# Patient Record
Sex: MALE | Race: WHITE | NOT HISPANIC OR LATINO | Employment: FULL TIME | URBAN - NONMETROPOLITAN AREA
[De-identification: names, ages, dates, MRNs, and addresses within clinical notes are randomized per-mention and may not be internally consistent; named-entity substitution may affect disease eponyms.]

---

## 2018-01-09 NOTE — PROGRESS NOTES
Assessment    1  Encounter for preventive health examination (V70 0) (Z00 00)   2  Colon cancer screening (V76 51) (Z12 11)   3  Soft tissue swelling of chest wall (786 6) (R22 2)   4  Cutaneous candidiasis (112 3) (B37 2)   5  Soft tissue mass (729 90) (M79 9)    Plan  Colon cancer screening    · COLONOSCOPY; Status:Active; Requested for:[id number];   Cutaneous candidiasis    · Nystatin 103518 UNIT/GM External Cream; APPLY BID PRN  Health Maintenance    · Aspirin 81 MG Oral Tablet; TAKE 1 TABLET DAILY   · B Complex Oral Capsule; TAKE 1 CAPSULE DAILY   · Zoster (Zostavax) (Zoster (Zostavax)); as directed  Soft tissue mass    · US GROIN; Status:Hold For - Scheduling; Requested for:[id number];   Soft tissue swelling of chest wall    · US CHEST; Status:Hold For - Scheduling; Requested for:11Apr2016;     Discussion/Summary  Impression: health maintenance visit  Currently, he eats a healthy diet and eats an adequate diet  Prostate cancer screening: the risks and benefits of prostate cancer screening were discussed and prostate cancer screening is current  Testicular cancer screening: the risks and benefits of testicular cancer screening were discussed, self testicular exam technique was taught and monthly self testicular exam was advised  Colorectal cancer screening: the risks and benefits of colorectal cancer screening were discussed, colonoscopy has been ordered and colonoscopy is needed every ten years  The risks and benefits of immunizations were discussed and immunizations will be given as outlined in the orders  He was advised to be evaluated by an optometrist and a dentist  Advice and education were given regarding nutrition, aerobic exercise, weight bearing exercise, weight loss, calcium supplements, vitamin D supplements, reproductive health, cardiovascular risk reduction, alcohol use, sunscreen use, self skin examination, helmet use, seat belt use, fall risk reduction and advanced directive planning   Patient discussion: discussed with the patient  Colonoscopy  Zostavax  Pneumovax  Possible side effects of new medications were reviewed with the patient/guardian today  History of Present Illness  HM, Adult Male: The patient is being seen for a health maintenance evaluation  General Health: The patient's health since the last visit is described as good  He has regular dental visits  He denies vision problems  He denies hearing loss  Immunizations status: up to date  Lifestyle:  He consumes a diverse and healthy diet  He does not have any weight concerns  He exercises regularly  He does not use tobacco  He denies alcohol use  He denies drug use  Reproductive health:  the patient is not sexually active  birth control is not being practiced  Screening: cancer screening reviewed and updated  metabolic screening reviewed and updated  risk screening reviewed and updated  Review of Systems    Constitutional: No fever or chills, feels well, no tiredness, no recent weight gain or weight loss  Eyes: No complaints of eye pain, no red eyes, no discharge from eyes, no itchy eyes  ENT: no complaints of earache, no hearing loss, no nosebleeds, no nasal discharge, no sore throat, no hoarseness  Cardiovascular: No complaints of slow heart rate, no fast heart rate, no chest pain, no palpitations, no leg claudication, no lower extremity  Respiratory: No complaints of shortness of breath, no wheezing, no cough, no SOB on exertion, no orthopnea or PND  Gastrointestinal: No complaints of abdominal pain, no constipation, no nausea or vomiting, no diarrhea or bloody stools  Genitourinary: No complaints of dysuria, no incontinence, no hesitancy, no nocturia, no genital lesion, no testicular pain  Musculoskeletal: No complaints of arthralgia, no myalgias, no joint swelling or stiffness, no limb pain or swelling     Integumentary: No complaints of skin rash or skin lesions, no itching, no skin wound, no dry skin    Neurological: No compliants of headache, no confusion, no convulsions, no numbness or tingling, no dizziness or fainting, no limb weakness, no difficulty walking  Psychiatric: Is not suicidal, no sleep disturbances, no anxiety or depression, no change in personality, no emotional problems  Endocrine: No complaints of proptosis, no hot flashes, no muscle weakness, no erectile dysfunction, no deepening of the voice, no feelings of weakness  Hematologic/Lymphatic: No complaints of swollen glands, no swollen glands in the neck, does not bleed easily, no easy bruising  Active Problems    1  Acute maxillary sinusitis, recurrence not specified (461 0) (J01 00)   2  Acute otitis media (382 9) (H66 90)   3  Dyslipidemia (272 4) (E78 5)   4  Fatigue (780 79) (R53 83)   5  Special screening examination for neoplasm of prostate (V76 44) (Z12 5)    Surgical History    · History of Appendectomy    Family History    · Family history of Diabetes (250 00) (E11 9)    · Family history of Cancer (199 1) (C80 1)    Social History    · Never a smoker   · No alcohol use    Current Meds   1  No Reported Medications Recorded    Allergies    1  Zantac    Vitals   Recorded: 11Apr2016 05:13PM   Heart Rate 66   Systolic 457, LUE, Sitting   Diastolic 78, LUE, Sitting   Height 5 ft 7 in   Weight 165 lb 8 0 oz   BMI Calculated 25 92   BSA Calculated 1 87   O2 Saturation 97     Physical Exam    Constitutional   General appearance: No acute distress, well appearing and well nourished  Eyes   Conjunctiva and lids: No swelling, erythema, or discharge  Pupils and irises: Equal, round and reactive to light  Ears, Nose, Mouth, and Throat   External inspection of ears and nose: Normal     Otoscopic examination: Tympanic membrance translucent with normal light reflex  Canals patent without erythema  Oropharynx: Normal with no erythema, edema, exudate or lesions      Pulmonary   Respiratory effort: No increased work of breathing or signs of respiratory distress  Auscultation of lungs: Clear to auscultation  Cardiovascular   Auscultation of heart: Normal rate and rhythm, normal S1 and S2, without murmurs  Examination of extremities for edema and/or varicosities: Normal     Abdomen   Abdomen: Non-tender, no masses  Liver and spleen: No hepatomegaly or splenomegaly  Lymphatic   Palpation of lymph nodes in neck: No lymphadenopathy  Musculoskeletal   Gait and station: Normal     Digits and nails: Normal without clubbing or cyanosis  Inspection/palpation of joints, bones, and muscles: Abnormal   soft tissue mass noted to lower chest wall c/w lipoma, small mass noted to suprapubic area  Skin   Skin and subcutaneous tissue: Abnormal   rash of cutaneous candidiasis noted to both groin creases,  Neurologic   Cranial nerves: Cranial nerves 2-12 intact  Reflexes: 2+ and symmetric  Sensation: No sensory loss  Psychiatric   Orientation to person, place and time: Normal     Mood and affect: Normal        Procedure    Procedure: Visual Acuity Test    Results: normal in both eyes  The patient tolerated the procedure well  There were no complications  Procedure: Hearing Acuity Test    Audiometry:   The patient Tolerated the procedure well  There were no complications  Signatures   Electronically signed by : GO Harmon; Apr 11 2016  6:09PM EST                       (Author)    Electronically signed by : Sera Rueda DO;  Apr 12 2016  9:33AM EST                       (Author)

## 2018-01-15 NOTE — RESULT NOTES
Verified Results  (1) COMPREHENSIVE METABOLIC PANEL [id number] 89:42YC [facility] Order Number: [id number]      National Kidney Disease Education Program recommendations are as follows:  GFR calculation is accurate only with a steady state creatinine  Chronic Kidney disease less than 60 ml/min/1 73 sq  meters  Kidney failure less than 15 ml/min/1 73 sq  meters  Test Name Result Flag Reference   GLUCOSE,RANDM 93 mg/dL     If the patient is fasting, the ADA then defines impaired fasting glucose as > 100 mg/dL and diabetes as > or equal to 123 mg/dL     SODIUM 141 mmol/L  136-145   POTASSIUM 4 1 mmol/L  3 5-5 3   CHLORIDE 105 mmol/L  100-108   CARBON DIOXIDE 28 mmol/L  21-32   ANION GAP (CALC) 8 mmol/L  4-13   BLOOD UREA NITROGEN 18 mg/dL  5-25   CREATININE 0 76 mg/dL  0 60-1 30   Standardized to IDMS reference method   CALCIUM 8 5 mg/dL  8 3-10 1   BILI, TOTAL 0 73 mg/dL  0 20-1 00   ALK PHOSPHATAS 83 U/L     ALT (SGPT) 69 U/L  12-78   AST(SGOT) 39 U/L  5-45   ALBUMIN 3 9 g/dL  3 5-5 0   TOTAL PROTEIN 6 6 g/dL  6 4-8 2   eGFR Non-African American      >60 0 ml/min/1 73sq m     (1) CBC/PLT/DIFF [id number] 11:32AM [facility] Order Number: [id number]     Order Number: [id number]     Test Name Result Flag Reference   WBC COUNT 4 37 Thousand/uL  4 31-10 16   RBC COUNT 4 93 Million/uL  3 88-5 62   HEMOGLOBIN 14 7 g/dL  12 0-17 0   HEMATOCRIT 42 3 %  36 5-49 3   MCV 86 fL  82-98   MCH 29 8 pg  26 8-34 3   MCHC 34 8 g/dL  31 4-37 4   RDW 12 4 %  11 6-15 1   MPV 10 5 fL  8 9-12 7   PLATELET COUNT 313 Thousands/uL  149-390   NEUTROPHILS RELATIVE PERCENT 45 %  43-75   LYMPHOCYTES RELATIVE PERCENT 36 %  14-44   MONOCYTES RELATIVE PERCENT 14 % H 4-12   EOSINOPHILS RELATIVE PERCENT 4 %  0-6   BASOPHILS RELATIVE PERCENT 1 %  0-1   NEUTROPHILS ABSOLUTE COUNT 1 98 Thousands/µL  1 85-7 62   LYMPHOCYTES ABSOLUTE COUNT 1 57 Thousands/µL  0 60-4 47   MONOCYTES ABSOLUTE COUNT 0 59 Thousand/µL  0 17-1 22 EOSINOPHILS ABSOLUTE COUNT 0 19 Thousand/µL  0 00-0 61   BASOPHILS ABSOLUTE COUNT 0 04 Thousands/µL  0 00-0 10     (1) LIPID PANEL, FASTING [id number] 11:32AM [facility] Order Number: [id number]      Triglyceride:         Normal              <150 mg/dl       Borderline High    150-199 mg/dl       High               200-499 mg/dl       Very High          >499 mg/dl  Cholesterol:         Desirable        <200 mg/dl      Borderline High  200-239 mg/dl      High             >239 mg/dl  HDL Cholesterol:        High    >59 mg/dL      Low     <41 mg/dL     Test Name Result Flag Reference   CHOLESTEROL 164 mg/dL     HDL,DIRECT 63 mg/dL H 40-60   LDL CHOLESTEROL CALCULATED 95 mg/dL  0-100   TRIGLYCERIDES 32 mg/dL  <=150     (1) PSA (SCREEN) (Dx V76 44 Screen for Prostate Cancer) [id number] 11:32AM [facility] Order Number: [id number]    TW Order Number: [id number]     Test Name Result Flag Reference   PROSTATE SPECIFIC ANTIGEN 0 5 ng/mL  0 0-4 0

## 2021-02-04 ENCOUNTER — OFFICE VISIT (OUTPATIENT)
Dept: FAMILY MEDICINE CLINIC | Facility: CLINIC | Age: 66
End: 2021-02-04
Payer: COMMERCIAL

## 2021-02-04 VITALS
HEIGHT: 66 IN | SYSTOLIC BLOOD PRESSURE: 108 MMHG | WEIGHT: 155 LBS | DIASTOLIC BLOOD PRESSURE: 76 MMHG | TEMPERATURE: 96 F | BODY MASS INDEX: 24.91 KG/M2

## 2021-02-04 DIAGNOSIS — K21.9 GASTROESOPHAGEAL REFLUX DISEASE, UNSPECIFIED WHETHER ESOPHAGITIS PRESENT: Primary | ICD-10-CM

## 2021-02-04 DIAGNOSIS — J30.89 PERENNIAL ALLERGIC RHINITIS: ICD-10-CM

## 2021-02-04 DIAGNOSIS — Z13.6 SCREENING FOR CARDIOVASCULAR CONDITION: ICD-10-CM

## 2021-02-04 DIAGNOSIS — Z13.228 SCREENING FOR METABOLIC DISORDER: ICD-10-CM

## 2021-02-04 DIAGNOSIS — Z12.11 SCREENING FOR COLORECTAL CANCER: ICD-10-CM

## 2021-02-04 DIAGNOSIS — Z00.00 WELCOME TO MEDICARE PREVENTIVE VISIT: ICD-10-CM

## 2021-02-04 DIAGNOSIS — Z12.12 SCREENING FOR COLORECTAL CANCER: ICD-10-CM

## 2021-02-04 DIAGNOSIS — Z12.5 SCREENING FOR PROSTATE CANCER: ICD-10-CM

## 2021-02-04 PROBLEM — G47.33 OBSTRUCTIVE SLEEP APNEA: Status: ACTIVE | Noted: 2021-02-04

## 2021-02-04 PROCEDURE — 3288F FALL RISK ASSESSMENT DOCD: CPT | Performed by: FAMILY MEDICINE

## 2021-02-04 PROCEDURE — 99214 OFFICE O/P EST MOD 30 MIN: CPT | Performed by: FAMILY MEDICINE

## 2021-02-04 PROCEDURE — 1101F PT FALLS ASSESS-DOCD LE1/YR: CPT | Performed by: FAMILY MEDICINE

## 2021-02-04 PROCEDURE — 1036F TOBACCO NON-USER: CPT | Performed by: FAMILY MEDICINE

## 2021-02-04 PROCEDURE — 3725F SCREEN DEPRESSION PERFORMED: CPT | Performed by: FAMILY MEDICINE

## 2021-02-04 PROCEDURE — 3008F BODY MASS INDEX DOCD: CPT | Performed by: FAMILY MEDICINE

## 2021-02-04 PROCEDURE — 1160F RVW MEDS BY RX/DR IN RCRD: CPT | Performed by: FAMILY MEDICINE

## 2021-02-04 RX ORDER — DESLORATADINE 5 MG/1
5 TABLET ORAL DAILY
Qty: 30 TABLET | Refills: 3 | Status: SHIPPED | OUTPATIENT
Start: 2021-02-04 | End: 2021-09-28

## 2021-02-04 RX ORDER — MULTIVITAMIN
CAPSULE ORAL
COMMUNITY

## 2021-02-04 RX ORDER — DESLORATADINE 5 MG/1
5 TABLET ORAL DAILY
Qty: 30 TABLET | Refills: 3 | Status: SHIPPED | OUTPATIENT
Start: 2021-02-04 | End: 2021-02-04 | Stop reason: SDUPTHER

## 2021-02-04 NOTE — PROGRESS NOTES
BMI Counseling: Body mass index is 25 02 kg/m²  The BMI is above normal  Nutrition recommendations include decreasing portion sizes, encouraging healthy choices of fruits and vegetables, moderation in carbohydrate intake and increasing intake of lean protein  Assessment/Plan:    Problem List Items Addressed This Visit     None           There are no diagnoses linked to this encounter  No problem-specific Assessment & Plan notes found for this encounter  Subjective:      Patient ID: Cortney Su is a 76 y o  male  Mr Rickey Maria is here for follow-up visit to reestablish himself as a patient he just recently went on Medicare he has a couple of issues on 1 of them being that he sounds like he has reflux and possibly a sliding hiatal hernia he is experiencing oral pharyngeal and esophageal dysphagia the 2nd issue is perennial allergies he normally takes Sudafed for this but during ragweed season and even through the entire winter he experiences nasal congestion and postnasal drip      The following portions of the patient's history were reviewed and updated as appropriate:   He has no past medical history on file  ,  does not have a problem list on file  ,   has no past surgical history on file  ,  family history is not on file  ,   reports that he has never smoked  He has never used smokeless tobacco  He reports current alcohol use  No history on file for drug ,  is allergic to chocolate and ranitidine     Current Outpatient Medications   Medication Sig Dispense Refill    Aspirin Buf,CaCarb-MgCarb-MgO, 81 MG TABS Take 1 tablet by mouth daily      B Complex Vitamins (B COMPLEX 50 PO) Take 1 capsule by mouth daily      Multiple Vitamin (multivitamin) capsule Take by mouth       No current facility-administered medications for this visit  Review of Systems   Constitutional: Negative for activity change, appetite change, diaphoresis, fatigue and fever  HENT: Positive for dental problem  Negative for hearing loss  Eyes: Positive for visual disturbance  Patient has had bilateral cataract extractions he is currently using mono vision because he has 2 different strength lenses having issues with reading needs to go back to his ophthalmologist   Respiratory: Positive for cough and shortness of breath  Negative for apnea, chest tightness and wheezing  Cardiovascular: Negative for chest pain, palpitations and leg swelling  Gastrointestinal: Negative for abdominal distention, abdominal pain, anal bleeding, constipation, diarrhea, nausea and vomiting  Endocrine: Negative for cold intolerance, heat intolerance, polydipsia, polyphagia and polyuria  Genitourinary: Negative for difficulty urinating, dysuria, flank pain, hematuria and urgency  Musculoskeletal: Positive for arthralgias  Negative for back pain, gait problem, joint swelling and myalgias  Skin: Negative for color change, rash and wound  Allergic/Immunologic: Negative for environmental allergies, food allergies and immunocompromised state  Neurological: Negative for dizziness, seizures, syncope, speech difficulty, numbness and headaches  Hematological: Negative for adenopathy  Does not bruise/bleed easily  Psychiatric/Behavioral: Negative for agitation, behavioral problems, hallucinations, sleep disturbance and suicidal ideas  Objective:  Vitals:    02/04/21 1424   BP: 108/76   BP Location: Left arm   Patient Position: Sitting   Cuff Size: Standard   Temp: (!) 96 °F (35 6 °C)   TempSrc: Tympanic   Weight: 70 3 kg (155 lb)   Height: 5' 6" (1 676 m)     Body mass index is 25 02 kg/m²  Physical Exam  Constitutional:       General: He is not in acute distress  Appearance: He is well-developed  He is not diaphoretic  HENT:      Head: Normocephalic  Right Ear: External ear normal       Left Ear: External ear normal       Nose: Nose normal    Eyes:      General: No scleral icterus          Right eye: No discharge  Left eye: No discharge  Conjunctiva/sclera: Conjunctivae normal       Pupils: Pupils are equal, round, and reactive to light  Neck:      Musculoskeletal: Normal range of motion  Thyroid: No thyromegaly  Trachea: No tracheal deviation  Cardiovascular:      Rate and Rhythm: Normal rate and regular rhythm  Heart sounds: Normal heart sounds  No murmur  No friction rub  No gallop  Pulmonary:      Effort: Pulmonary effort is normal  No respiratory distress  Breath sounds: Normal breath sounds  No wheezing  Abdominal:      General: Bowel sounds are normal       Palpations: Abdomen is soft  There is no mass  Tenderness: There is no abdominal tenderness  There is no guarding  Genitourinary:     Prostate: Normal       Rectum: Normal    Musculoskeletal:         General: No deformity  Lymphadenopathy:      Cervical: No cervical adenopathy  Skin:     General: Skin is warm and dry  Findings: No erythema or rash  Neurological:      Mental Status: He is alert and oriented to person, place, and time  Cranial Nerves: No cranial nerve deficit  Psychiatric:         Thought Content:  Thought content normal

## 2021-02-04 NOTE — PATIENT INSTRUCTIONS

## 2021-02-10 ENCOUNTER — TELEPHONE (OUTPATIENT)
Dept: GASTROENTEROLOGY | Facility: MEDICAL CENTER | Age: 66
End: 2021-02-10

## 2021-03-26 ENCOUNTER — IMMUNIZATIONS (OUTPATIENT)
Dept: FAMILY MEDICINE CLINIC | Facility: HOSPITAL | Age: 66
End: 2021-03-26

## 2021-03-26 DIAGNOSIS — Z23 ENCOUNTER FOR IMMUNIZATION: Primary | ICD-10-CM

## 2021-03-26 PROCEDURE — 0011A SARS-COV-2 / COVID-19 MRNA VACCINE (MODERNA) 100 MCG: CPT

## 2021-03-26 PROCEDURE — 91301 SARS-COV-2 / COVID-19 MRNA VACCINE (MODERNA) 100 MCG: CPT

## 2021-04-22 ENCOUNTER — HOSPITAL ENCOUNTER (INPATIENT)
Facility: HOSPITAL | Age: 66
LOS: 3 days | Discharge: HOME | DRG: 094 | End: 2021-04-25
Attending: SURGERY | Admitting: SURGERY
Payer: OTHER MISCELLANEOUS

## 2021-04-22 ENCOUNTER — APPOINTMENT (EMERGENCY)
Dept: CT IMAGING | Facility: HOSPITAL | Age: 66
End: 2021-04-22
Payer: OTHER MISCELLANEOUS

## 2021-04-22 ENCOUNTER — APPOINTMENT (INPATIENT)
Dept: RADIOLOGY | Facility: HOSPITAL | Age: 66
DRG: 094 | End: 2021-04-22
Payer: OTHER MISCELLANEOUS

## 2021-04-22 ENCOUNTER — HOSPITAL ENCOUNTER (EMERGENCY)
Facility: HOSPITAL | Age: 66
End: 2021-04-22
Attending: EMERGENCY MEDICINE
Payer: OTHER MISCELLANEOUS

## 2021-04-22 ENCOUNTER — APPOINTMENT (EMERGENCY)
Dept: RADIOLOGY | Facility: HOSPITAL | Age: 66
End: 2021-04-22
Payer: OTHER MISCELLANEOUS

## 2021-04-22 VITALS
DIASTOLIC BLOOD PRESSURE: 81 MMHG | RESPIRATION RATE: 22 BRPM | WEIGHT: 155 LBS | SYSTOLIC BLOOD PRESSURE: 151 MMHG | HEART RATE: 62 BPM | OXYGEN SATURATION: 95 % | HEIGHT: 66 IN | TEMPERATURE: 98.2 F | BODY MASS INDEX: 24.91 KG/M2

## 2021-04-22 DIAGNOSIS — S22.49XA RIB FRACTURES: ICD-10-CM

## 2021-04-22 DIAGNOSIS — J93.9 PNEUMOTHORAX: ICD-10-CM

## 2021-04-22 DIAGNOSIS — J93.9 PNEUMOTHORAX ON RIGHT: Primary | ICD-10-CM

## 2021-04-22 DIAGNOSIS — S22.41XA CLOSED FRACTURE OF MULTIPLE RIBS OF RIGHT SIDE: ICD-10-CM
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
## 2021-04-22 LAB
ALBUMIN SERPL BCP-MCNC: 3.7 G/DL (ref 3.5–5)
ALP SERPL-CCNC: 77 U/L (ref 46–116)
ALT SERPL W P-5'-P-CCNC: 46 U/L (ref 12–78)
ANION GAP SERPL CALCULATED.3IONS-SCNC: 7 MMOL/L (ref 4–13)
AST SERPL W P-5'-P-CCNC: 34 U/L (ref 5–45)
BASOPHILS # BLD AUTO: 0.05 THOUSANDS/ΜL (ref 0–0.1)
BASOPHILS NFR BLD AUTO: 1 % (ref 0–1)
BILIRUB DIRECT SERPL-MCNC: 0.17 MG/DL (ref 0–0.2)
BILIRUB SERPL-MCNC: 0.61 MG/DL (ref 0.2–1)
BUN SERPL-MCNC: 24 MG/DL (ref 5–25)
CALCIUM SERPL-MCNC: 8.6 MG/DL (ref 8.3–10.1)
CHLORIDE SERPL-SCNC: 106 MMOL/L (ref 100–108)
CO2 SERPL-SCNC: 30 MMOL/L (ref 21–32)
CREAT SERPL-MCNC: 0.8 MG/DL (ref 0.6–1.3)
EOSINOPHIL # BLD AUTO: 0.21 THOUSAND/ΜL (ref 0–0.61)
EOSINOPHIL NFR BLD AUTO: 4 % (ref 0–6)
ERYTHROCYTE [DISTWIDTH] IN BLOOD BY AUTOMATED COUNT: 12.4 % (ref 11.6–15.1)
GFR SERPL CREATININE-BSD FRML MDRD: 93 ML/MIN/1.73SQ M
GLUCOSE SERPL-MCNC: 111 MG/DL (ref 65–140)
HCT VFR BLD AUTO: 39.1 % (ref 36.5–49.3)
HGB BLD-MCNC: 13.1 G/DL (ref 12–17)
IMM GRANULOCYTES # BLD AUTO: 0.02 THOUSAND/UL (ref 0–0.2)
IMM GRANULOCYTES NFR BLD AUTO: 0 % (ref 0–2)
LYMPHOCYTES # BLD AUTO: 1.4 THOUSANDS/ΜL (ref 0.6–4.47)
LYMPHOCYTES NFR BLD AUTO: 29 % (ref 14–44)
MCH RBC QN AUTO: 30.1 PG (ref 26.8–34.3)
MCHC RBC AUTO-ENTMCNC: 33.5 G/DL (ref 31.4–37.4)
MCV RBC AUTO: 90 FL (ref 82–98)
MONOCYTES # BLD AUTO: 0.56 THOUSAND/ΜL (ref 0.17–1.22)
MONOCYTES NFR BLD AUTO: 12 % (ref 4–12)
NEUTROPHILS # BLD AUTO: 2.59 THOUSANDS/ΜL (ref 1.85–7.62)
NEUTS SEG NFR BLD AUTO: 54 % (ref 43–75)
NRBC BLD AUTO-RTO: 0 /100 WBCS
PLATELET # BLD AUTO: 188 THOUSANDS/UL (ref 149–390)
PMV BLD AUTO: 9.7 FL (ref 8.9–12.7)
POTASSIUM SERPL-SCNC: 3.7 MMOL/L (ref 3.5–5.3)
PROT SERPL-MCNC: 6.8 G/DL (ref 6.4–8.2)
RBC # BLD AUTO: 4.35 MILLION/UL (ref 3.88–5.62)
SODIUM SERPL-SCNC: 143 MMOL/L (ref 136–145)
TROPONIN I SERPL-MCNC: <0.02 NG/ML
WBC # BLD AUTO: 4.83 THOUSAND/UL (ref 4.31–10.16)

## 2021-04-22 PROCEDURE — 99285 EMERGENCY DEPT VISIT HI MDM: CPT

## 2021-04-22 PROCEDURE — 71045 X-RAY EXAM CHEST 1 VIEW: CPT

## 2021-04-22 PROCEDURE — 99221 1ST HOSP IP/OBS SF/LOW 40: CPT | Performed by: SURGERY

## 2021-04-22 PROCEDURE — 85025 COMPLETE CBC W/AUTO DIFF WBC: CPT | Performed by: EMERGENCY MEDICINE

## 2021-04-22 PROCEDURE — 93005 ELECTROCARDIOGRAM TRACING: CPT

## 2021-04-22 PROCEDURE — 36415 COLL VENOUS BLD VENIPUNCTURE: CPT | Performed by: EMERGENCY MEDICINE

## 2021-04-22 PROCEDURE — 94668 MNPJ CHEST WALL SBSQ: CPT

## 2021-04-22 PROCEDURE — 70450 CT HEAD/BRAIN W/O DYE: CPT

## 2021-04-22 PROCEDURE — 80048 BASIC METABOLIC PNL TOTAL CA: CPT | Performed by: EMERGENCY MEDICINE

## 2021-04-22 PROCEDURE — 71260 CT THORAX DX C+: CPT

## 2021-04-22 PROCEDURE — 80076 HEPATIC FUNCTION PANEL: CPT | Performed by: EMERGENCY MEDICINE

## 2021-04-22 PROCEDURE — 84484 ASSAY OF TROPONIN QUANT: CPT | Performed by: EMERGENCY MEDICINE

## 2021-04-22 PROCEDURE — 99285 EMERGENCY DEPT VISIT HI MDM: CPT | Performed by: EMERGENCY MEDICINE

## 2021-04-22 PROCEDURE — 74177 CT ABD & PELVIS W/CONTRAST: CPT

## 2021-04-22 RX ORDER — ONDANSETRON 2 MG/ML
4 INJECTION INTRAMUSCULAR; INTRAVENOUS EVERY 6 HOURS PRN
Status: DISCONTINUED | OUTPATIENT
Start: 2021-04-22 | End: 2021-04-25 | Stop reason: HOSPADM

## 2021-04-22 RX ORDER — SENNOSIDES 8.6 MG
2 TABLET ORAL DAILY
Status: DISCONTINUED | OUTPATIENT
Start: 2021-04-23 | End: 2021-04-25 | Stop reason: HOSPADM

## 2021-04-22 RX ORDER — HYDROMORPHONE HCL IN WATER/PF 6 MG/30 ML
0.2 PATIENT CONTROLLED ANALGESIA SYRINGE INTRAVENOUS
Status: DISCONTINUED | OUTPATIENT
Start: 2021-04-22 | End: 2021-04-25 | Stop reason: HOSPADM

## 2021-04-22 RX ORDER — METHOCARBAMOL 500 MG/1
500 TABLET, FILM COATED ORAL EVERY 6 HOURS SCHEDULED
Status: DISCONTINUED | OUTPATIENT
Start: 2021-04-22 | End: 2021-04-25 | Stop reason: HOSPADM

## 2021-04-22 RX ORDER — OXYCODONE HYDROCHLORIDE 5 MG/1
5 TABLET ORAL EVERY 4 HOURS PRN
Status: DISCONTINUED | OUTPATIENT
Start: 2021-04-22 | End: 2021-04-22

## 2021-04-22 RX ORDER — ACETAMINOPHEN 325 MG/1
975 TABLET ORAL EVERY 8 HOURS SCHEDULED
Status: DISCONTINUED | OUTPATIENT
Start: 2021-04-22 | End: 2021-04-25 | Stop reason: HOSPADM

## 2021-04-22 RX ORDER — PSEUDOEPHEDRINE HCL 60 MG/1
60 TABLET ORAL EVERY 4 HOURS PRN
COMMUNITY

## 2021-04-22 RX ORDER — ASPIRIN 81 MG/1
81 TABLET, CHEWABLE ORAL DAILY
Status: DISCONTINUED | OUTPATIENT
Start: 2021-04-23 | End: 2021-04-23

## 2021-04-22 RX ORDER — LIDOCAINE 50 MG/G
1 PATCH TOPICAL DAILY
Status: DISCONTINUED | OUTPATIENT
Start: 2021-04-23 | End: 2021-04-25 | Stop reason: HOSPADM

## 2021-04-22 RX ORDER — HYDROMORPHONE HCL/PF 1 MG/ML
0.5 SYRINGE (ML) INJECTION
Status: DISCONTINUED | OUTPATIENT
Start: 2021-04-22 | End: 2021-04-22

## 2021-04-22 RX ORDER — DOCUSATE SODIUM 100 MG/1
100 CAPSULE, LIQUID FILLED ORAL 2 TIMES DAILY
Status: DISCONTINUED | OUTPATIENT
Start: 2021-04-22 | End: 2021-04-25 | Stop reason: HOSPADM

## 2021-04-22 RX ORDER — OXYCODONE HYDROCHLORIDE 5 MG/1
2.5 TABLET ORAL EVERY 4 HOURS PRN
Status: DISCONTINUED | OUTPATIENT
Start: 2021-04-22 | End: 2021-04-25 | Stop reason: HOSPADM

## 2021-04-22 RX ORDER — MELATONIN
2000 DAILY
COMMUNITY

## 2021-04-22 RX ORDER — OXYCODONE HYDROCHLORIDE 10 MG/1
10 TABLET ORAL EVERY 4 HOURS PRN
Status: DISCONTINUED | OUTPATIENT
Start: 2021-04-22 | End: 2021-04-22

## 2021-04-22 RX ORDER — OXYCODONE HYDROCHLORIDE 5 MG/1
5 TABLET ORAL EVERY 4 HOURS PRN
Status: DISCONTINUED | OUTPATIENT
Start: 2021-04-22 | End: 2021-04-25 | Stop reason: HOSPADM

## 2021-04-22 RX ADMIN — OXYCODONE HYDROCHLORIDE 5 MG: 5 TABLET ORAL at 20:32

## 2021-04-22 RX ADMIN — METHOCARBAMOL 500 MG: 500 TABLET, FILM COATED ORAL at 20:31

## 2021-04-22 RX ADMIN — ENOXAPARIN SODIUM 30 MG: 30 INJECTION SUBCUTANEOUS at 21:27

## 2021-04-22 RX ADMIN — IOHEXOL 100 ML: 350 INJECTION, SOLUTION INTRAVENOUS at 17:24

## 2021-04-22 RX ADMIN — DOCUSATE SODIUM 100 MG: 100 CAPSULE, LIQUID FILLED ORAL at 20:32

## 2021-04-22 RX ADMIN — ACETAMINOPHEN 975 MG: 325 TABLET ORAL at 21:27

## 2021-04-22 NOTE — H&P
H&P Exam - Trauma   Mariah Lawler 76 y o  male MRN: [id number]  Unit/Bed#:  Encounter: [id number]    Assessment/Plan   Trauma Alert: Other transfer from [address]: Ambulance  Trauma Team: Attending Santos Gan , Residents Ra and Fellow Ishan  Consultants: None    Trauma Active Problems:   Fall   Right 2nd-5th rib fractures   Right pneumothorax     Trauma Plan:   Trauma admission   Repeat CXR in AM   Rib fracture protocol   Pain control   APS consult   Continuous pulse ox   Incentive spirometry   PT/OT evals when able     Chief Complaint: Right chest wall pain     History of Present Illness   HPI:  Mariah Lawler is a 76 y o  male who presents as a transfer from [facility] for right 2nd-5th rib fractures and associated pneumothorax status post fall 2 days ago  Patient states that he was on a ladder when fell 4 feet onto the ground hitting his right side  He had immediate right sided chest wall pain and associated shortness of breath after falling  Denies any chest pain or dizziness/lightheadedness prior to falling His shortness of breath resolved the same night he fell  He has continued to have right chest wall pain that is pleuritic and progressing in severity  Denies head strike or LOC  No AC  Takes 81mg ASA daily for health maintenance per PCP recommendations  Denies headache, changes in vision, substernal chest pain, SOB, abdominal pain, N/V, or back pain  Past medical  Cardiac workup done at [facility] ED prior to transfer was unremarkable (EKG showed normal sinus rhythm, normal axis, normal intervals and QRS and no ST changes; troponin negative)  Mechanism:Fall    Review of Systems   All other systems reviewed and are negative  12-point, complete review of systems was reviewed and negative except as stated above  Historical Information     No past medical history on file    Past Surgical History:   Procedure Laterality Date    APPENDECTOMY       Social History   Social History Substance and Sexual Activity   Alcohol Use Yes    Frequency: Monthly or less     Social History     Substance and Sexual Activity   Drug Use Never     Social History     Tobacco Use   Smoking Status Never Smoker   Smokeless Tobacco Never Used     E-Cigarette/Vaping    E-Cigarette Use Never User      E-Cigarette/Vaping Substances     Immunization History   Administered Date(s) Administered    INFLUENZA 11/18/2017, 10/01/2020    Influenza, seasonal, injectable 12/14/2013    Pneumococcal Conjugate 13-Valent 09/26/2020    Pneumococcal Polysaccharide PPV23 04/11/2016    SARS-CoV-2 / COVID-19 mRNA IM (Lowell Mcdaniel) 03/26/2021       Meds/Allergies   all current active meds have been reviewed    Allergies   Allergen Reactions    Chocolate - Food Allergy Other (See Comments)     Cold sweats    Ranitidine Edema         PHYSICAL EXAM    Objective   Vitals:   First set:      Primary Survey:   (A) Airway: intact   (B) Breathing: clear bilaterally   (C) Circulation: Pulses:   normal  (D) Disabliity:  GCS Total:  15  (E) Expose:  Completed    Secondary Survey: (Click on Physical Exam tab above)  Physical Exam  Constitutional:       General: He is not in acute distress  Appearance: He is not ill-appearing or toxic-appearing  HENT:      Head: Normocephalic and atraumatic  Right Ear: External ear normal       Left Ear: External ear normal       Nose: Nose normal       Mouth/Throat:      Mouth: Mucous membranes are moist       Pharynx: Oropharynx is clear  Eyes:      Extraocular Movements: Extraocular movements intact  Pupils: Pupils are equal, round, and reactive to light  Neck:      Musculoskeletal: Normal range of motion  No neck rigidity or muscular tenderness  Cardiovascular:      Rate and Rhythm: Normal rate and regular rhythm  Pulses: Normal pulses  Heart sounds: Normal heart sounds  Pulmonary:      Effort: Pulmonary effort is normal  No respiratory distress        Breath sounds: Decreased breath sounds present  Comments: Decreased breath sounds on right compared to left  Right sided chest wall tenderness; no palpable deformity, step-off, or crepitus   Chest:      Chest wall: Tenderness present  Abdominal:      General: Abdomen is flat  There is no distension  Palpations: Abdomen is soft  Tenderness: There is no abdominal tenderness  Musculoskeletal: Normal range of motion  General: No tenderness or signs of injury  Skin:     General: Skin is warm  Capillary Refill: Capillary refill takes less than 2 seconds  Neurological:      General: No focal deficit present  Mental Status: He is alert and oriented to person, place, and time  Cranial Nerves: No cranial nerve deficit  Motor: No weakness  Psychiatric:         Mood and Affect: Mood normal          Behavior: Behavior normal          Invasive Devices     Peripheral Intravenous Line            Peripheral IV 04/22/21 Right Antecubital less than 1 day                Lab Results: Results: I have personally reviewed pertinent reports  Imaging/EKG Studies: Results: I have personally reviewed pertinent reports      Other Studies: None     Code Status: Level 1 - Full Code  Advance Directive and Living Will:      Power of :    POLST:

## 2021-04-22 NOTE — ASSESSMENT & PLAN NOTE
- Multiple right-sided rib fractures (2-5), present on admission   - Continue rib fracture protocol   - Continue to encourage incentive spirometer use and adequate pulmonary hygiene  Currently pulling 1250 mL on I S   - Continue multimodal analgesic regimen  Appreciate APS evaluation and recommendations   - Supplemental oxygen via nasal cannula as needed to maintain saturations greater than or equal to 94%  Currently on 2 L of oxygen at 98%  - Repeat chest x-ray from 4/23 reviewed: persistent right PTX  - PT and OT evaluation and treatment as indicated  Recommending DC home  - Repeat chest x-ray 4/24  - Outpatient follow-up in the trauma clinic for re-evaluation in approximately 2 weeks

## 2021-04-22 NOTE — ED PROVIDER NOTES
History  Chief Complaint   Patient presents with    Fall     pt fell two days ago, landing on his right side, denies hitting head, no loc, takes 81 mg aspirin daily     HPI  This is a 58-year-old male who fell 2 days ago comes in for evaluation of right-sided chest pain and right flank pain  Patient states that he was up on approximately a 3 ft ladder when it same can to the dirt he fell to his right side  He denies hitting his head but states he was dazed after the event  States he did have trouble breathing just after the event that resolved but now he has pain with breathing that is progressively worsening  He denies any shortness of breath denies any near-syncope or syncope  He has pain on the right side of his chest to the center of his chest   He denies any neck pain denies any back pain denies any fevers or chills  Prior to Admission Medications   Prescriptions Last Dose Informant Patient Reported? Taking? Aspirin Buf,CaCarb-MgCarb-MgO, 81 MG TABS   Yes No   Sig: Take 1 tablet by mouth daily   B Complex Vitamins (B COMPLEX 50 PO)   Yes No   Sig: Take 1 capsule by mouth daily   Multiple Vitamin (multivitamin) capsule   Yes No   Sig: Take by mouth   desloratadine (CLARINEX) 5 MG tablet   No No   Sig: Take 1 tablet (5 mg total) by mouth daily      Facility-Administered Medications: None       History reviewed  No pertinent past medical history  Past Surgical History:   Procedure Laterality Date    APPENDECTOMY         History reviewed  No pertinent family history  I have reviewed and agree with the history as documented  E-Cigarette/Vaping    E-Cigarette Use Never User      E-Cigarette/Vaping Substances     Social History     Tobacco Use    Smoking status: Never Smoker    Smokeless tobacco: Never Used   Substance Use Topics    Alcohol use: Yes     Frequency: Monthly or less    Drug use: Never       Review of Systems   Constitutional: Negative  Negative for chills and fever     HENT: Negative  Negative for ear pain and sore throat  Eyes: Negative  Negative for pain and discharge  Respiratory: Positive for chest tightness  Negative for shortness of breath  Cardiovascular: Positive for chest pain  Negative for palpitations  Gastrointestinal: Negative  Negative for abdominal pain, nausea and vomiting  Endocrine: Negative  Negative for polyphagia and polyuria  Genitourinary: Negative  Negative for dysuria and flank pain  Musculoskeletal: Negative  Negative for arthralgias and back pain  Skin: Negative  Negative for color change and wound  Allergic/Immunologic: Negative  Negative for food allergies and immunocompromised state  Neurological: Negative  Negative for weakness and headaches  Hematological: Negative  Negative for adenopathy  Does not bruise/bleed easily  Psychiatric/Behavioral: Negative  Negative for suicidal ideas  The patient is not nervous/anxious  Physical Exam  Physical Exam  Vitals signs and nursing note reviewed  Constitutional:       General: He is not in acute distress  Appearance: Normal appearance  He is not diaphoretic  HENT:      Head: Normocephalic and atraumatic  Right Ear: External ear normal       Left Ear: External ear normal       Nose: Nose normal  No congestion or rhinorrhea  Mouth/Throat:      Mouth: Mucous membranes are moist    Eyes:      General: No scleral icterus  Right eye: No discharge  Left eye: No discharge  Conjunctiva/sclera: Conjunctivae normal       Pupils: Pupils are equal, round, and reactive to light  Neck:      Musculoskeletal: Normal range of motion and neck supple  No neck rigidity  Cardiovascular:      Rate and Rhythm: Regular rhythm  Pulses: Normal pulses  Heart sounds: Normal heart sounds  Pulmonary:      Effort: Pulmonary effort is normal  No respiratory distress  Breath sounds: Normal breath sounds  No stridor  No wheezing, rhonchi or rales  Chest:      Chest wall: Tenderness (Mild right-sided chest tenderness, no crepitus ) present  Abdominal:      General: Abdomen is flat  There is no distension  Palpations: Abdomen is soft  Tenderness: There is no abdominal tenderness  There is no guarding  Musculoskeletal: Normal range of motion  General: No swelling, tenderness or deformity  Skin:     General: Skin is warm and dry  Capillary Refill: Capillary refill takes less than 2 seconds  Findings: No lesion or rash  Neurological:      General: No focal deficit present  Mental Status: He is alert and oriented to person, place, and time  Cranial Nerves: No cranial nerve deficit  Sensory: No sensory deficit  Psychiatric:         Mood and Affect: Mood normal          Behavior: Behavior normal          Thought Content:  Thought content normal          Vital Signs  ED Triage Vitals [04/22/21 1607]   Temperature Pulse Respirations Blood Pressure SpO2   98 2 °F (36 8 °C) 68 18 147/85 96 %      Temp Source Heart Rate Source Patient Position - Orthostatic VS BP Location FiO2 (%)   Temporal Monitor Sitting Right arm --      Pain Score       9           Vitals:    04/22/21 1607 04/22/21 1700 04/22/21 1800 04/22/21 1845   BP: 147/85 118/67 150/82 151/81   Pulse: 68 (!) 54 63 62   Patient Position - Orthostatic VS: Sitting Lying Sitting Sitting         Visual Acuity  Visual Acuity      Most Recent Value   L Pupil Size (mm)  3   R Pupil Size (mm)  3          ED Medications  Medications   iohexol (OMNIPAQUE) 350 MG/ML injection (MULTI-DOSE) 100 mL (100 mL Intravenous Given 4/22/21 1724)       Diagnostic Studies  Results Reviewed     Procedure Component Value Units Date/Time    Troponin I [[id number]]  (Normal) Collected: 04/22/21 1634    Lab Status: Final result Specimen: Blood from Arm, Right Updated: 04/22/21 1656     Troponin I <0 02 ng/mL     Basic metabolic panel [[id number]] Collected: 04/22/21 1634    Lab Status: Final result Specimen: Blood from Arm, Right Updated: 04/22/21 1653     Sodium 143 mmol/L      Potassium 3 7 mmol/L      Chloride 106 mmol/L      CO2 30 mmol/L      ANION GAP 7 mmol/L      BUN 24 mg/dL      Creatinine 0 80 mg/dL      Glucose 111 mg/dL      Calcium 8 6 mg/dL      eGFR 93 ml/min/1 73sq m     Narrative:      [place] guidelines for Chronic Kidney Disease (CKD):     Stage 1 with normal or high GFR (GFR > 90 mL/min/1 73 square meters)    Stage 2 Mild CKD (GFR = 60-89 mL/min/1 73 square meters)    Stage 3A Moderate CKD (GFR = 45-59 mL/min/1 73 square meters)    Stage 3B Moderate CKD (GFR = 30-44 mL/min/1 73 square meters)    Stage 4 Severe CKD (GFR = 15-29 mL/min/1 73 square meters)    Stage 5 End Stage CKD (GFR <15 mL/min/1 73 square meters)  Note: GFR calculation is accurate only with a steady state creatinine    Hepatic function panel [[id number]]  (Normal) Collected: 04/22/21 1634    Lab Status: Final result Specimen: Blood from Arm, Right Updated: 04/22/21 1653     Total Bilirubin 0 61 mg/dL      Bilirubin, Direct 0 17 mg/dL      Alkaline Phosphatase 77 U/L      AST 34 U/L      ALT 46 U/L      Total Protein 6 8 g/dL      Albumin 3 7 g/dL     CBC and differential [[id number]] Collected: 04/22/21 1634    Lab Status: Final result Specimen: Blood from Arm, Right Updated: 04/22/21 1641     WBC 4 83 Thousand/uL      RBC 4 35 Million/uL      Hemoglobin 13 1 g/dL      Hematocrit 39 1 %      MCV 90 fL      MCH 30 1 pg      MCHC 33 5 g/dL      RDW 12 4 %      MPV 9 7 fL      Platelets 095 Thousands/uL      nRBC 0 /100 WBCs      Neutrophils Relative 54 %      Immat GRANS % 0 %      Lymphocytes Relative 29 %      Monocytes Relative 12 %      Eosinophils Relative 4 %      Basophils Relative 1 %      Neutrophils Absolute 2 59 Thousands/µL      Immature Grans Absolute 0 02 Thousand/uL      Lymphocytes Absolute 1 40 Thousands/µL      Monocytes Absolute 0 56 Thousand/µL      Eosinophils Absolute 0 21 Thousand/µL      Basophils Absolute 0 05 Thousands/µL                  CT chest abdomen pelvis w contrast   Final Result by Paul Toscano MD (04/22 1808)      Multiple right nondisplaced 2nd through 5th lateral rib fractures with moderate-sized pneumothorax  No mediastinal shift  Small right pleural effusion with moderate partial atelectasis of the right middle and lower lobes  Punctate focus of gas above the right diaphragm near the cardiophrenic sulcus  No pneumoperitoneum  I personally discussed this study with Abena Lira on 4/22/2021 at 6:02 PM                   Workstation performed: [id number]         CT head without contrast   Final Result by Paul Toscano MD (04/22 1739)      No acute intracranial abnormality  Workstation performed: [id number]         XR chest 1 view portable    (Results Pending)              Procedures  Procedures         ED Course  ED Course as of Apr 22 1917   Thu Apr 22, 2021   1746 I did speak with trauma fellow, Dr Delmar Lyon at [facility]  Patient will require transfer to the Trauma Service  I did discuss with the fellow regarding placement of a chest tube here  I did stress that I was willing to put in here  If I did that though, it would require the patient flies to [place] in a patient who is otherwise stable and has been stable for the last 2 days  After discussion with the trauma fellow, I will do a chest x-ray here that the fellow can compare when he gets to [place]  Will defer chest tube here until patient goes to [place] as he is otherwise stable  This EKG was interpreted by me  The EKG demonstrates Normal sinus rhythm, normal intervals and axis, normal QRS, no acute ST changes present  MDM  Number of Diagnoses or Management Options  Fall:   Pneumothorax:   Diagnosis management comments: 50-year-old male who presents after fall    Fall was 48 hours ago, he has normal vital signs, no obvious head trauma, will defer trauma alert at this time, will move forward with CT head, CT chest abdomen pelvis  Will get CBC, BMP prior to CT scan  Disposition will be pending results of workup  Disposition  Final diagnoses:   Fall   Pneumothorax   Rib fractures     Time reflects when diagnosis was documented in both MDM as applicable and the Disposition within this note     Time User Action Codes Description Comment    4/22/2021  5:46 PM Kevin Krause [R06  XXXA] Fall     4/22/2021  5:46 PM Kevin Coreasomons [J93 9] Pneumothorax     4/22/2021  7:17 PM Mason Mckeon Add [H31 [id number]] Rib fractures       ED Disposition     ED Disposition Condition Date/Time Comment    Transfer to Another Facility-In Network  Thu Apr 22, 2021  5:46 PM Adriana Givens should be transferred out to [facility]           MD Documentation      Most Recent Value   Patient Condition  The patient has been stabilized such that within reasonable medical probability, no material deterioration of the patient condition or the condition of the unborn child(hawa) is likely to result from the transfer   Reason for Transfer  Level of Care needed not available at this facility   Benefits of Transfer  Specialized equipment and/or services available at the receiving facility (Include comment)________________________   Risks of Transfer  Potential for delay in receiving treatment   Accepting Physician  Dr Rosamaria López Name, Radha Cole   Sending MD Dr Khushboo Baker   Provider Certification  General risk, such as traffic hazards, adverse weather conditions, rough terrain or turbulence, possible failure of equipment (including vehicle or aircraft), or consequences of actions of persons outside the control of the transport personnel      RN Documentation      [facility] [address] [facility] Name, [address]   B   Bed Assignment  ED   Report Given to  Ramon Mckinnon RN      Follow-up Information     Follow up With Specialties Details Why Contact Info    Cindy Taylor, [address]   [address]  [address]  [phone]            Discharge Medication List as of 4/22/2021  6:55 PM      CONTINUE these medications which have NOT CHANGED    Details   Aspirin Buf,CaCarb-MgCarb-MgO, 81 MG TABS Take 1 tablet by mouth daily, Starting Mon 4/11/2016, Historical Med      B Complex Vitamins (B COMPLEX 50 PO) Take 1 capsule by mouth daily, Starting Mon 4/11/2016, Historical Med      desloratadine (CLARINEX) 5 MG tablet Take 1 tablet (5 mg total) by mouth daily, Starting Thu 2/4/2021, Normal      Multiple Vitamin (multivitamin) capsule Take by mouth, Historical Med           No discharge procedures on file      PDMP Review     None          ED Provider  Electronically Signed by           Laxmi Hood MD  04/22/21 0159

## 2021-04-22 NOTE — LETTER
[address] [facility] [address]  [address] [id number]  Dept: [phone]    April 25, 2021     Patient: Mariah Lawler   YOB: 1955   Date of Visit: 4/22/2021       To Whom it May Concern:    Mariah Lawler is under my professional care  He was seen in the hospital from 4/22/2021   to 04/25/21  He is not cleared to return to work until seen and cleared by trauma in 2 weeks  If you have any questions or concerns, please don't hesitate to call           Sincerely,          Haley Costa PA-C

## 2021-04-22 NOTE — LETTER
[address] [facility] 6  [address] [facility] [address]  Dept: [phone]    April 25, 2021     Patient: Mono Vidal   YOB: 1955   Date of Visit: 4/22/2021       To Whom it May Concern:    Mono Vidal is under my professional care  He was seen in the hospital from 4/22/2021   to 04/25/21  He may not return to work until seen and cleared by the trauma service in 2 weeks  If you have any questions or concerns, please don't hesitate to call           Sincerely,          Brianna Bone PA-C

## 2021-04-22 NOTE — EMTALA/ACUTE CARE TRANSFER
[address] [facility] EMERGENCY DEPARTMENT  [address] [facility] [address]  Dept: [phone]      EMTALA TRANSFER CONSENT    NAME Johnna Ganser                                         1955                              MRN [id number]    I have been informed of my rights regarding examination, treatment, and transfer   by Dr Matthew Peters MD    Benefits: Specialized equipment and/or services available at the receiving facility (Include comment)________________________    Risks: Potential for delay in receiving treatment      Consent for Transfer:  I acknowledge that my medical condition has been evaluated and explained to me by the emergency department physician or other qualified medical person and/or my attending physician, who has recommended that I be transferred to the service of  Accepting Physician: Dr Mariia Taveras at [address] [facility] Name, [address] : [facility]  The above potential benefits of such transfer, the potential risks associated with such transfer, and the probable risks of not being transferred have been explained to me, and I fully understand them  The doctor has explained that, in my case, the benefits of transfer outweigh the risks  I agree to be transferred  I authorize the performance of emergency medical procedures and treatments upon me in both transit and upon arrival at the receiving facility  Additionally, I authorize the release of any and all medical records to the receiving facility and request they be transported with me, if possible  I understand that the safest mode of transportation during a medical emergency is an ambulance and that the Hospital advocates the use of this mode of transport  Risks of traveling to the receiving facility by car, including absence of medical control, life sustaining equipment, such as oxygen, and medical personnel has been explained to me and I fully understand them      (DIVINE CORRECT BOX BELOW)  [  ]  I consent to the stated transfer and to be transported by ambulance/helicopter  [  ]  I consent to the stated transfer, but refuse transportation by ambulance and accept full responsibility for my transportation by car  I understand the risks of non-ambulance transfers and I exonerate the Hospital and its staff from any deterioration in my condition that results from this refusal     X___________________________________________    DATE  21  TIME________  Signature of patient or legally responsible individual signing on patient behalf           RELATIONSHIP TO PATIENT_________________________          Provider Certification    NAME Mario Loaiza                                         1955                              MRN [id number]    A medical screening exam was performed on the above named patient  Based on the examination:    Condition Necessitating Transfer The primary encounter diagnosis was Fall  A diagnosis of Pneumothorax was also pertinent to this visit  Patient Condition: The patient has been stabilized such that within reasonable medical probability, no material deterioration of the patient condition or the condition of the unborn child(hawa) is likely to result from the transfer    Reason for Transfer: Level of Care needed not available at this facility    Transfer Requirements: Facility [facility]   · Space available and qualified personnel available for treatment as acknowledged by    · Agreed to accept transfer and to provide appropriate medical treatment as acknowledged by       Dr Adeola Quiroga  · Appropriate medical records of the examination and treatment of the patient are provided at the time of transfer   [address], [address] _______  · Transfer will be performed by qualified personnel from    and appropriate transfer equipment as required, including the use of necessary and appropriate life support measures      Provider Certification: I have examined the patient and explained the following risks and benefits of being transferred/refusing transfer to the patient/family:  General risk, such as traffic hazards, adverse weather conditions, rough terrain or turbulence, possible failure of equipment (including vehicle or aircraft), or consequences of actions of persons outside the control of the transport personnel      Based on these reasonable risks and benefits to the patient and/or the unborn child(hawa), and based upon the information available at the time of the patients examination, I certify that the medical benefits reasonably to be expected from the provision of appropriate medical treatments at another medical facility outweigh the increasing risks, if any, to the individuals medical condition, and in the case of labor to the unborn child, from effecting the transfer      X____________________________________________ DATE 04/22/21        TIME_______      ORIGINAL - SEND TO MEDICAL RECORDS   COPY - SEND WITH PATIENT DURING TRANSFER

## 2021-04-22 NOTE — ASSESSMENT & PLAN NOTE
· Status post fall 4/20   · 4/22 CT CAP: Multiple right nondisplaced 2nd through 5th lateral rib fractures with moderate-sized pneumothorax  No mediastinal shift  Small right pleural effusion with moderate partial atelectasis of the right middle and lower lobes    · Hemodynamically stable, spo2 >94% on RA   · Currently on 2 L O2 with 98%  · Aggressive pulmonary hygiene  · Respiratory protocol   · Continuous pulse ox  · Repeat CXR in AM 4/23: persistent pneumothorax  · Repeat CXR in AM 4/24  · Chest tube vs  Observe PTX

## 2021-04-23 ENCOUNTER — APPOINTMENT (INPATIENT)
Dept: RADIOLOGY | Facility: HOSPITAL | Age: 66
DRG: 094 | End: 2021-04-23
Payer: OTHER MISCELLANEOUS

## 2021-04-23 LAB
ANION GAP SERPL CALCULATED.3IONS-SCNC: 5 MMOL/L (ref 4–13)
ATRIAL RATE: 62 BPM
BUN SERPL-MCNC: 18 MG/DL (ref 5–25)
CALCIUM SERPL-MCNC: 8.4 MG/DL (ref 8.3–10.1)
CHLORIDE SERPL-SCNC: 110 MMOL/L (ref 100–108)
CO2 SERPL-SCNC: 27 MMOL/L (ref 21–32)
CREAT SERPL-MCNC: 0.59 MG/DL (ref 0.6–1.3)
ERYTHROCYTE [DISTWIDTH] IN BLOOD BY AUTOMATED COUNT: 12.5 % (ref 11.6–15.1)
GFR SERPL CREATININE-BSD FRML MDRD: 105 ML/MIN/1.73SQ M
GLUCOSE SERPL-MCNC: 95 MG/DL (ref 65–140)
HCT VFR BLD AUTO: 38.8 % (ref 36.5–49.3)
HGB BLD-MCNC: 13.2 G/DL (ref 12–17)
MAGNESIUM SERPL-MCNC: 2.2 MG/DL (ref 1.6–2.6)
MCH RBC QN AUTO: 30.3 PG (ref 26.8–34.3)
MCHC RBC AUTO-ENTMCNC: 34 G/DL (ref 31.4–37.4)
MCV RBC AUTO: 89 FL (ref 82–98)
P AXIS: 48 DEGREES
PHOSPHATE SERPL-MCNC: 3.7 MG/DL (ref 2.3–4.1)
PLATELET # BLD AUTO: 177 THOUSANDS/UL (ref 149–390)
PMV BLD AUTO: 10.1 FL (ref 8.9–12.7)
POTASSIUM SERPL-SCNC: 3.6 MMOL/L (ref 3.5–5.3)
PR INTERVAL: 158 MS
QRS AXIS: 39 DEGREES
QRSD INTERVAL: 88 MS
QT INTERVAL: 402 MS
QTC INTERVAL: 408 MS
RBC # BLD AUTO: 4.35 MILLION/UL (ref 3.88–5.62)
SODIUM SERPL-SCNC: 142 MMOL/L (ref 136–145)
T WAVE AXIS: 42 DEGREES
VENTRICULAR RATE: 62 BPM
WBC # BLD AUTO: 5.06 THOUSAND/UL (ref 4.31–10.16)

## 2021-04-23 PROCEDURE — 71045 X-RAY EXAM CHEST 1 VIEW: CPT

## 2021-04-23 PROCEDURE — 0W9930Z DRAINAGE OF RIGHT PLEURAL CAVITY WITH DRAINAGE DEVICE, PERCUTANEOUS APPROACH: ICD-10-PCS | Performed by: SURGERY

## 2021-04-23 PROCEDURE — 85027 COMPLETE CBC AUTOMATED: CPT | Performed by: ORTHOPAEDIC SURGERY

## 2021-04-23 PROCEDURE — 93010 ELECTROCARDIOGRAM REPORT: CPT | Performed by: INTERNAL MEDICINE

## 2021-04-23 PROCEDURE — 80048 BASIC METABOLIC PNL TOTAL CA: CPT | Performed by: ORTHOPAEDIC SURGERY

## 2021-04-23 PROCEDURE — 94760 N-INVAS EAR/PLS OXIMETRY 1: CPT

## 2021-04-23 PROCEDURE — 94664 DEMO&/EVAL PT USE INHALER: CPT

## 2021-04-23 PROCEDURE — 97166 OT EVAL MOD COMPLEX 45 MIN: CPT

## 2021-04-23 PROCEDURE — 99232 SBSQ HOSP IP/OBS MODERATE 35: CPT | Performed by: SURGERY

## 2021-04-23 PROCEDURE — 97162 PT EVAL MOD COMPLEX 30 MIN: CPT

## 2021-04-23 PROCEDURE — 84100 ASSAY OF PHOSPHORUS: CPT | Performed by: ORTHOPAEDIC SURGERY

## 2021-04-23 PROCEDURE — 83735 ASSAY OF MAGNESIUM: CPT | Performed by: ORTHOPAEDIC SURGERY

## 2021-04-23 PROCEDURE — NC001 PR NO CHARGE: Performed by: SURGERY

## 2021-04-23 PROCEDURE — 32551 INSERTION OF CHEST TUBE: CPT | Performed by: SURGERY

## 2021-04-23 PROCEDURE — 94668 MNPJ CHEST WALL SBSQ: CPT

## 2021-04-23 RX ORDER — LIDOCAINE HYDROCHLORIDE 10 MG/ML
20 INJECTION, SOLUTION EPIDURAL; INFILTRATION; INTRACAUDAL; PERINEURAL ONCE
Status: COMPLETED | OUTPATIENT
Start: 2021-04-23 | End: 2021-04-23

## 2021-04-23 RX ORDER — GABAPENTIN 100 MG/1
100 CAPSULE ORAL 3 TIMES DAILY
Status: DISCONTINUED | OUTPATIENT
Start: 2021-04-23 | End: 2021-04-25 | Stop reason: HOSPADM

## 2021-04-23 RX ADMIN — OXYCODONE HYDROCHLORIDE 5 MG: 5 TABLET ORAL at 16:32

## 2021-04-23 RX ADMIN — GABAPENTIN 100 MG: 100 CAPSULE ORAL at 21:46

## 2021-04-23 RX ADMIN — LIDOCAINE HYDROCHLORIDE 20 ML: 10 INJECTION, SOLUTION EPIDURAL; INFILTRATION; INTRACAUDAL at 14:25

## 2021-04-23 RX ADMIN — METHOCARBAMOL 500 MG: 500 TABLET, FILM COATED ORAL at 17:28

## 2021-04-23 RX ADMIN — OXYCODONE HYDROCHLORIDE 5 MG: 5 TABLET ORAL at 21:46

## 2021-04-23 RX ADMIN — METHOCARBAMOL 500 MG: 500 TABLET, FILM COATED ORAL at 23:11

## 2021-04-23 RX ADMIN — METHOCARBAMOL 500 MG: 500 TABLET, FILM COATED ORAL at 01:15

## 2021-04-23 RX ADMIN — METHOCARBAMOL 500 MG: 500 TABLET, FILM COATED ORAL at 06:19

## 2021-04-23 RX ADMIN — ACETAMINOPHEN 975 MG: 325 TABLET ORAL at 21:46

## 2021-04-23 RX ADMIN — LIDOCAINE 1 PATCH: 50 PATCH TOPICAL at 10:30

## 2021-04-23 RX ADMIN — DOCUSATE SODIUM 100 MG: 100 CAPSULE, LIQUID FILLED ORAL at 10:31

## 2021-04-23 RX ADMIN — METHOCARBAMOL 500 MG: 500 TABLET, FILM COATED ORAL at 12:12

## 2021-04-23 RX ADMIN — SENNOSIDES 17.2 MG: 8.6 TABLET, FILM COATED ORAL at 10:31

## 2021-04-23 RX ADMIN — ASPIRIN 81 MG: 81 TABLET, CHEWABLE ORAL at 10:31

## 2021-04-23 RX ADMIN — ACETAMINOPHEN 975 MG: 325 TABLET ORAL at 14:24

## 2021-04-23 RX ADMIN — DOCUSATE SODIUM 100 MG: 100 CAPSULE, LIQUID FILLED ORAL at 17:28

## 2021-04-23 RX ADMIN — OXYCODONE HYDROCHLORIDE 5 MG: 5 TABLET ORAL at 10:33

## 2021-04-23 RX ADMIN — ACETAMINOPHEN 975 MG: 325 TABLET ORAL at 06:19

## 2021-04-23 NOTE — PROGRESS NOTES
[address] [facility]  Progress Note Humberto Obregon 1955, 76 y o  male MRN: [id number]  Unit/Bed#: [facility] 633-01 Encounter: [id number]  Primary Care Provider: Fabio Schaefer DO   Date and time admitted to hospital: 4/22/2021  8:02 PM    [address]  · Fall from ladder  · Injuries as listed   · PT/OT recommending discharge home    Closed fracture of multiple ribs of right side  Assessment & Plan  - Multiple right-sided rib fractures (2-5), present on admission   - Continue rib fracture protocol   - Continue to encourage incentive spirometer use and adequate pulmonary hygiene  Currently pulling 1250 mL on I S   - Continue multimodal analgesic regimen  Appreciate APS evaluation and recommendations   - Supplemental oxygen via nasal cannula as needed to maintain saturations greater than or equal to 94%  Currently on 2 L of oxygen at 98%  - Repeat chest x-ray from 4/23 reviewed: persistent right PTX  - PT and OT evaluation and treatment as indicated  Recommending DC home  - Repeat chest x-ray 4/24  - Outpatient follow-up in the trauma clinic for re-evaluation in approximately 2 weeks  * Pneumothorax on right  Assessment & Plan  · Status post fall 4/20   · 4/22 CT CAP: Multiple right nondisplaced 2nd through 5th lateral rib fractures with moderate-sized pneumothorax  No mediastinal shift  Small right pleural effusion with moderate partial atelectasis of the right middle and lower lobes  · Hemodynamically stable, spo2 >94% on RA   · Currently on 2 L O2 with 98%  · Aggressive pulmonary hygiene  · Respiratory protocol   · Continuous pulse ox  · Repeat CXR in AM 4/23: persistent pneumothorax  · Repeat CXR in AM 4/24  · Chest tube vs  Observe PTX      TERTIARY TRAUMA SURVEY NOTE    Prophylaxis: Sequential compression device (Venodyne)  and Enoxaparin (Lovenox)    Disposition: Continue med surg level of care  Monitor respiratory status, pain level   ASA 81 and Lovenox held for APS consult  Chest tube vs  Observe PTX  Discharge home when medically stable    Code status:  Level 1 - Full Code    Consultants: APS    Is the patient 72 years or older?: YES:    1  Before the illness or injury that brought you to the Emergency, did you need someone to help you on a regular basis? 0=No   2  Since the illness or injury that brought you to the Emergency, have you needed more help than usual to take care of yourself? 0=No   3  Have you been hospitalized for one or more nights during the past 6 months (excluding a stay in the Emergency Department)? 0=No   4  In general, do you see well? 0=Yes   5  In general, do you have serious problems with your memory? 0=No   6  Do you take more than three different medications everyday? 0=No   TOTAL   0     Did you order a geriatric consult if the score was 2 or greater?: n/a    Identification of Seniors at Risk      Most Recent Value   (ISAR) Identification of Seniors at Risk   Before the illness or injury that brought you to the Emergency, did you need someone to help you on a regular basis? 0 Filed at: 04/22/2021 2014   In the last 24 hours, have you needed more help than usual?  0 Filed at: 04/22/2021 2014   Have you been hospitalized for one or more nights during the past 6 months? 0 Filed at: 04/22/2021 2014   In general, do you see well?  0 Filed at: 04/22/2021 2014   In general, do you have serious problems with your memory? 0 Filed at: 04/22/2021 2014   Do you take more than three different medications every day?  0 Filed at: 04/22/2021 2014   ISAR Score  0 Filed at: 04/22/2021 2014            SUBJECTIVE:     Transfer from: [facility]  Outside Films Received: yes  Tertiary Exam Due on: 4/23/2021    Mechanism of Injury:Fall    Details related to Injury: +LOC:  no    Chief Complaint: "I feel OK"    HPI/Last 24 hour events:  Patient reports that he feels okay  He has right-sided chest wall pain    Patient reports that he came to the hospital yesterday after having 2 nights of chest pain, difficulty sleeping due to shortness of breath after falling from a ladder on Tuesday, 4/20/2021  Patient is currently having nonlabored breathing on room air, saturating 95%  Patient reports that he has been going to work, ambulating without difficulty  Only having difficulty breathing when lying flat, trying to go to sleep  He admits that he does not take aspirin daily, but he has been taking ibuprofen for headaches    Active medications:           Current Facility-Administered Medications:     acetaminophen (TYLENOL) tablet 975 mg, 975 mg, Oral, Q8H NATY, 975 mg at 04/23/21 5521    aspirin chewable tablet 81 mg, 81 mg, Oral, Daily, 81 mg at 04/23/21 1031    docusate sodium (COLACE) capsule 100 mg, 100 mg, Oral, BID, 100 mg at 04/23/21 1031    HYDROmorphone HCl (DILAUDID) injection 0 2 mg, 0 2 mg, Intravenous, Q3H PRN    lidocaine (LIDODERM) 5 % patch 1 patch, 1 patch, Topical, Daily, 1 patch at 04/23/21 1030    methocarbamol (ROBAXIN) tablet 500 mg, 500 mg, Oral, Q6H NATY, 500 mg at 04/23/21 0619    ondansetron (ZOFRAN) injection 4 mg, 4 mg, Intravenous, Q6H PRN    oxyCODONE (ROXICODONE) IR tablet 2 5 mg, 2 5 mg, Oral, Q4H PRN    oxyCODONE (ROXICODONE) IR tablet 5 mg, 5 mg, Oral, Q4H PRN, 5 mg at 04/23/21 1033    senna (SENOKOT) tablet 17 2 mg, 2 tablet, Oral, Daily, 17 2 mg at 04/23/21 1031      OBJECTIVE:     Vitals:   Vitals:    04/23/21 0745   BP: 102/65   Pulse:    Resp:    Temp:    SpO2:        Physical Exam:   GENERAL APPEARANCE: Patient in no acute distress  HEENT: NCAT; PERRL, EOMs intact; Mucous membranes moist  NECK / BACK: ROM intact, nontender  CV: Regular rate and rhythm; no murmur/gallops/rubs appreciated  CHEST / LUNGS: Clear to auscultation, breath sounds throughout; no wheezes/rales/rhonci  ABD: NABS; soft; non-distended; non-tender  : Voiding  EXT: +2 pulses bilaterally upper & lower extremities; no edema    NEURO: GCS 15; no focal neurologic deficits; neurovascularly intact  SKIN: Warm, dry and well perfused; no rash; no jaundice  I/O:   I/O       04/21 0701 - 04/22 0700 04/22 0701 - 04/23 0700 04/23 0701 - 04/24 0700    P  O   360     Total Intake(mL/kg)  360 (5 1)     Net  +360            Unmeasured Urine Occurrence  1 x 1 x          Invasive Devices: Invasive Devices     Peripheral Intravenous Line            Peripheral IV 04/22/21 Right Antecubital less than 1 day                  Imaging:   Xr Chest 1 View Portable    Result Date: 4/23/2021  Impression: Persistent moderate right pneumothorax  Workstation performed: [id number]     Xr Chest Portable    Result Date: 4/23/2021  Impression: Persistent moderate right pneumothorax    Workstation performed: [id number]     Xr Chest Portable    Result Date: 4/23/2021  Impression: Persistent moderate right pneumothorax  Workstation performed: [id number]     Ct Head Without Contrast    Result Date: 4/22/2021  Impression: No acute intracranial abnormality  Workstation performed: [id number]     Ct Chest Abdomen Pelvis W Contrast    Result Date: 4/22/2021  Impression: Multiple right nondisplaced 2nd through 5th lateral rib fractures with moderate-sized pneumothorax  No mediastinal shift  Small right pleural effusion with moderate partial atelectasis of the right middle and lower lobes  Punctate focus of gas above the right diaphragm near the cardiophrenic sulcus  No pneumoperitoneum    I personally discussed this study with Bárbara Cline on 4/22/2021 at 6:02 PM  Workstation performed: [id number]       Labs:   CBC:   Lab Results   Component Value Date    WBC 5 06 04/23/2021    HGB 13 2 04/23/2021    HCT 38 8 04/23/2021    MCV 89 04/23/2021     04/23/2021    MCH 30 3 04/23/2021    MCHC 34 0 04/23/2021    RDW 12 5 04/23/2021    MPV 10 1 04/23/2021    NRBC 0 04/22/2021     CMP:   Lab Results   Component Value Date     (H) 04/23/2021    CO2 27 04/23/2021    BUN 18 04/23/2021 CREATININE 0 59 (L) 04/23/2021    CALCIUM 8 4 04/23/2021    AST 34 04/22/2021    ALT 46 04/22/2021    ALKPHOS 77 04/22/2021    EGFR 105 04/23/2021

## 2021-04-23 NOTE — CASE MANAGEMENT
Pt is NOT A Bundle  Pt IS NOT A 30 Day Readmission    Pt was seen by therapy and cleared for a home d/c  Pt has a walker at home and doesn't need any other DME  CM will follow for other d/c needs closer to d/c  Pt's family will transport the pt home when medically stable    CM reviewed d/c planning process including the following: identifying help at home, patient preference for d/c planning needs, Discharge Lounge, [facility] Meds to Bed program, availability of treatment team to discuss questions or concerns patient and/or family may have regarding understanding medications and recognizing signs and symptoms once discharged  CM also encouraged patient to follow up with all recommended appointments after discharge  Patient advised of importance for patient and family to participate in managing patients medical well being

## 2021-04-23 NOTE — PLAN OF CARE
Problem: Potential for Falls  Goal: Patient will remain free of falls  Description: INTERVENTIONS:  - Assess patient frequently for physical needs  -  Identify cognitive and physical deficits and behaviors that affect risk of falls    -  [place] fall precautions as indicated by assessment   - Educate patient/family on patient safety including physical limitations  - Instruct patient to call for assistance with activity based on assessment  - Modify environment to reduce risk of injury  - Consider OT/PT consult to assist with strengthening/mobility  Outcome: Progressing     Problem: RESPIRATORY - ADULT  Goal: Achieves optimal ventilation and oxygenation  Description: INTERVENTIONS:  - Assess for changes in respiratory status  - Assess for changes in mentation and behavior  - Position to facilitate oxygenation and minimize respiratory effort  - Oxygen administered by appropriate delivery if ordered  - Initiate smoking cessation education as indicated  - Encourage broncho-pulmonary hygiene including cough, deep breathe, Incentive Spirometry  - Assess the need for suctioning and aspirate as needed  - Assess and instruct to report SOB or any respiratory difficulty  - Respiratory Therapy support as indicated  Outcome: Progressing     Problem: MUSCULOSKELETAL - ADULT  Goal: Maintain or return mobility to safest level of function  Description: INTERVENTIONS:  - Assess patient's ability to carry out ADLs; assess patient's baseline for ADL function and identify physical deficits which impact ability to perform ADLs (bathing, care of mouth/teeth, toileting, grooming, dressing, etc )  - Assess/evaluate cause of self-care deficits   - Assess range of motion  - Assess patient's mobility  - Assess patient's need for assistive devices and provide as appropriate  - Encourage maximum independence but intervene and supervise when necessary  - Involve family in performance of ADLs  - Assess for home care needs following discharge - Consider OT consult to assist with ADL evaluation and planning for discharge  - Provide patient education as appropriate  Outcome: Progressing  Goal: Maintain proper alignment of affected body part  Description: INTERVENTIONS:  - Support, maintain and protect limb and body alignment  - Provide patient/ family with appropriate education  Outcome: Progressing     Problem: PAIN - ADULT  Goal: Verbalizes/displays adequate comfort level or baseline comfort level  Description: Interventions:  - Encourage patient to monitor pain and request assistance  - Assess pain using appropriate pain scale  - Administer analgesics based on type and severity of pain and evaluate response  - Implement non-pharmacological measures as appropriate and evaluate response  - Consider cultural and social influences on pain and pain management  - Notify physician/advanced practitioner if interventions unsuccessful or patient reports new pain  Outcome: Progressing     Problem: INFECTION - ADULT  Goal: Absence or prevention of progression during hospitalization  Description: INTERVENTIONS:  - Assess and monitor for signs and symptoms of infection  - Monitor lab/diagnostic results  - Monitor all insertion sites, i e  indwelling lines, tubes, and drains  - Monitor endotracheal if appropriate and nasal secretions for changes in amount and color  - [place] appropriate cooling/warming therapies per order  - Administer medications as ordered  - Instruct and encourage patient and family to use good hand hygiene technique  - Identify and instruct in appropriate isolation precautions for identified infection/condition  Outcome: Progressing     Problem: SAFETY ADULT  Goal: Patient will remain free of falls  Description: INTERVENTIONS:  - Assess patient frequently for physical needs  -  Identify cognitive and physical deficits and behaviors that affect risk of falls    -  [place] fall precautions as indicated by assessment   - Educate patient/family on patient safety including physical limitations  - Instruct patient to call for assistance with activity based on assessment  - Modify environment to reduce risk of injury  - Consider OT/PT consult to assist with strengthening/mobility  Outcome: Progressing  Goal: Maintain or return to baseline ADL function  Description: INTERVENTIONS:  -  Assess patient's ability to carry out ADLs; assess patient's baseline for ADL function and identify physical deficits which impact ability to perform ADLs (bathing, care of mouth/teeth, toileting, grooming, dressing, etc )  - Assess/evaluate cause of self-care deficits   - Assess range of motion  - Assess patient's mobility; develop plan if impaired  - Assess patient's need for assistive devices and provide as appropriate  - Encourage maximum independence but intervene and supervise when necessary  - Involve family in performance of ADLs  - Assess for home care needs following discharge   - Consider OT consult to assist with ADL evaluation and planning for discharge  - Provide patient education as appropriate  Outcome: Progressing  Goal: Maintain or return mobility status to optimal level  Description: INTERVENTIONS:  - Assess patient's baseline mobility status (ambulation, transfers, stairs, etc )    - Identify cognitive and physical deficits and behaviors that affect mobility  - Identify mobility aids required to assist with transfers and/or ambulation (gait belt, sit-to-stand, lift, walker, cane, etc )  - [place] fall precautions as indicated by assessment  - Record patient progress and toleration of activity level on Mobility SBAR; progress patient to next Phase/Stage  - Instruct patient to call for assistance with activity based on assessment  - Consider rehabilitation consult to assist with strengthening/weightbearing, etc   Outcome: Progressing     Problem: DISCHARGE PLANNING  Goal: Discharge to home or other facility with appropriate resources  Description: INTERVENTIONS:  - Identify barriers to discharge w/patient and caregiver  - Arrange for needed discharge resources and transportation as appropriate  - Identify discharge learning needs (meds, wound care, etc )  - Arrange for interpretive services to assist at discharge as needed  - Refer to Case Management Department for coordinating discharge planning if the patient needs post-hospital services based on physician/advanced practitioner order or complex needs related to functional status, cognitive ability, or social support system  Outcome: Progressing     Problem: Knowledge Deficit  Goal: Patient/family/caregiver demonstrates understanding of disease process, treatment plan, medications, and discharge instructions  Description: Complete learning assessment and assess knowledge base    Interventions:  - Provide teaching at level of understanding  - Provide teaching via preferred learning methods  Outcome: Progressing

## 2021-04-23 NOTE — RESPIRATORY THERAPY NOTE
RT Protocol Note  [employer] 76 y o  male MRN: [id number]  Unit/Bed#: [facility] 633-01 Encounter: [id number]    Assessment    Principal Problem:    Pneumothorax on right  Active Problems:    Closed fracture of multiple ribs of right side    Fall      Home Pulmonary Medications:         History reviewed  No pertinent past medical history  Social History     Socioeconomic History    Marital status: /Civil Union     Spouse name: None    Number of children: None    Years of education: None    Highest education level: None   Occupational History    None   Social Needs    Financial resource strain: None    Food insecurity     Worry: None     Inability: None    Transportation needs     Medical: None     Non-medical: None   Tobacco Use    Smoking status: Never Smoker    Smokeless tobacco: Never Used   Substance and Sexual Activity    Alcohol use: Yes     Frequency: Monthly or less     Drinks per session: 1 or 2     Binge frequency: Never    Drug use: Never    Sexual activity: None   Lifestyle    Physical activity     Days per week: None     Minutes per session: None    Stress: None   Relationships    Social connections     Talks on phone: None     Gets together: None     Attends Church service: None     Active member of club or organization: None     Attends meetings of clubs or organizations: None     Relationship status: None    Intimate partner violence     Fear of current or ex partner: None     Emotionally abused: None     Physically abused: None     Forced sexual activity: None   Other Topics Concern    None   Social History Narrative    None       Subjective         Objective    Physical Exam:   Assessment Type: Pre-treatment  General Appearance: Alert, Awake  Respiratory Pattern: Normal  Chest Assessment: Chest expansion symmetrical  Bilateral Breath Sounds: Clear, Diminished    Vitals:  Blood pressure (!) 166/112, pulse 73, temperature 97 9 °F (36 6 °C), temperature source Oral, resp  rate 18, height 5' 7" (1 702 m), weight 71 2 kg (157 lb), SpO2 96 %  Imaging and other studies: I have personally reviewed pertinent reports  Plan       Airway Clearance Plan: Incentive Spirometer, Flutter     Resp Comments: Patient fell few days ago off the third step of a ladder  Xray shows rib fractures  Starting with IS for Rib fracture protocol  M D  requesting flutter TID BS  clear and diminished 97% on room air  C/O of pain  IS 1250    Flutter tolerated well

## 2021-04-23 NOTE — OCCUPATIONAL THERAPY NOTE
Occupational Therapy Evaluation     Patient Name: Janes Lares  XKTER'L Date: 4/23/2021  Problem List  Principal Problem:    Pneumothorax on right  Active Problems:    Closed fracture of multiple ribs of right side    Fall    Past Medical History  History reviewed  No pertinent past medical history  Past Surgical History  Past Surgical History:   Procedure Laterality Date    APPENDECTOMY      GUM SURGERY           04/23/21 0909   OT Last Visit   OT Visit Date 04/23/21   Note Type   Note type Evaluation   Restrictions/Precautions   Weight Bearing Precautions Per Order No   Other Precautions O2;Pain; Fall Risk   Pain Assessment   Pain Assessment Tool 0-10   Pain Score 7   Pain Location/Orientation Orientation: Right;Location: Rib Cage   Patient's Stated Pain Goal No pain   Hospital Pain Intervention(s) Repositioned; Ambulation/increased activity; Emotional support   Home Living   Type of [address] [place] [address] Two level;Ramped entrance  ([address] )   Bathroom Shower/Tub Tub/shower unit   Bathroom Toilet Standard   Bathroom Equipment Shower chair;Commode   Bathroom Accessibility Accessible   Home Equipment Walker;Cane;Wheelchair-manual   Additional Comments NO USE OF DME AT BASELINE    Prior Function   Level of [place] Independent with ADLs and functional mobility   Lives With [facility] From Family   ADL Assistance Independent   IADLs Independent   Falls in the last 6 months 1 to 4  (1, REASON FOR ADMISSION )   Vocational Full time employment   Lifestyle   Autonomy PT REPORTS BEING I WITH ADLS/IADLS/DRIVING PTA    Reciprocal Relationships LIVES WITH SPOUSE WHO IS W/C  BOUND  PT REPORTS HE PROVIDES ASSIST WITH TXFS AT BASELINE AND IS HER PRIMARY CAREGIVER  PT REPORTS HIS DAUGHTER IS CURRENTLY ASSISTING SPOUSE  DAUGHTER IS ABLE TO ASSIST PT AND SPOUSE UPON D/C     Service to Others WORKS FULL TIME IN MAINTENANCE    Intrinsic Gratification ENJOYS SPENDING TIME WITH SPOUSE Psychosocial   Psychosocial (WDL) WDL   ADL   Eating Assistance 7  Independent   Grooming Assistance 5  Supervision/Setup   UB Bathing Assistance 5  Supervision/Setup   LB Bathing Assistance 4  Minimal Assistance   UB Dressing Assistance 5  Supervision/Setup   LB Dressing Assistance 4  Minimal Assistance   Toileting Assistance  5  Supervision/Setup   Functional Assistance 5  Supervision/Setup   Bed Mobility   Supine to Sit 5  Supervision   Additional items Increased time required   Sit to Supine Unable to assess  (PT LEFT OOB WITH ALL NEEDS IN REACH )   Transfers   Sit to Stand 5  Supervision   Additional items Assist x 1; Increased time required;Verbal cues   Stand to Sit 5  Supervision   Additional items Assist x 1; Increased time required;Verbal cues   Functional Mobility   Functional Mobility 5  Supervision   Additional items Rolling walker   Balance   Static Sitting Fair +   Static Standing Fair   Ambulatory Fair -   Activity Tolerance   Activity Tolerance Patient tolerated treatment well;Patient limited by pain   Medical Staff Made Aware Walker Ryder PT  CM    Nurse Made Aware APPROPRIATE TO SEE PER RN    RUE Assessment   RUE Assessment WFL   LUE Assessment   LUE Assessment WFL   Hand Function   Gross Motor Coordination Functional   Fine Motor Coordination Functional   Cognition   Overall Cognitive Status WFL   Arousal/Participation Alert; Cooperative   Attention Within functional limits   Orientation Level Oriented X4   Memory Within functional limits   Following Commands Follows all commands and directions without difficulty   Comments PT IS PLEASANT AND COOPERATIVE  REPORTS - HEADSTRIKE/LOC   Assessment   Assessment 77 YO Male SEEN FOR INITIAL OCCUPATIONAL THERAPY EVALUATION FOLLOWING TXF FROM [facility] S/P FALL FROM LADDER ~2 DAYS AGO RESULTING IN R SIDED RIB FX AND PNEUMOTHORAX   PT IS FROM HOME WITH SPOUSE WHO IS WC BOUND WHERE HE REPORTS BEING INDEPENDENT WITH ADLS/IADLS/DRIVING AND REPORTS BEING THE PRIMARY CAREGIVER FOR SPOUSE  PT'S DAUGHTER IS CURRENTLY CARING FOR SPOUSE AND HE REPORTS SHE IS ABLE TO ADDITIONALLY ASSIST HIM AS NEEDED AS WELL UPON D/C  PT CURRENTLY REQUIRES OVERALL SUPERVISION-MIN A WITH ADLS, AND SUPERVISION WITH TRANSFERS /FUNCTIONAL MOBILITY WITH USE OF RW  PT ON 2L O2 T/O SESSION WITH O2 SATS IN MID 90'S WITH ACTIVITY  PT IS EXPERIENCING EXPECTED LIMITATIONS 2' PAIN, FATIGUE, IMPAIRED BALANCE and OVERALL LIMITED ACTIVITY TOLERANCE  PT EDUCATED ON DEEP BREATHING TECHNIQUES T/O ACTIVITY, SLOWING OF PACE, ENERGY CONSERVATION TECHNIQUES FOR CARRY OVER UPON D/C, INCREASED FAMILY SUPPORT and CONTINUE PARTICIPATION IN SELF-CARE/MOBILITY WITH STAFF Nicho W Cordell Ortiz   The patient's raw score on the AM-PAC Daily Activity inpatient short form is 19, standardized score is 40 22, greater than 39 4  Patients at this level are likely to benefit from discharge to home  Please refer to the recommendation of the Occupational Therapist for safe discharge planning  FROM AN OCCUPATIONAL THERAPY PERSPECTIVE, PT CAN RETURN HOME WITH INCREASED FAMILY SUPPORT WHEN MEDICALLY CLEARED  DME RECS INCLUDE USE OF SC AND AGREE WITH USE OF RW  ALL QUESTIONS/CONCERNS ADDRESSED  NO ADDITIONAL ACUTE CARE OT NEEDS  D/C OT      Goals   Patient Goals TO RETURN HOME    Recommendation   OT Discharge Recommendation No rehabilitation needs  (INCREASED FAMILY SUPPORT )   Equipment Recommended   (USE OF SC )   OT - OK to Discharge Yes   AM-PAC Daily Activity Inpatient   Lower Body Dressing 3   Bathing 3   Toileting 3   Upper Body Dressing 3   Grooming 3   Eating 4   Daily Activity Raw Score 19   Daily Activity Standardized Score (Calc for Raw Score >=11) 40 22   AM-PAC Applied Cognition Inpatient   Following a Speech/Presentation 4   Understanding Ordinary Conversation 4   Taking Medications 4   Remembering Where Things Are Placed or Put Away 4   Remembering List of 4-5 Errands 4   Taking Care of Complicated Tasks 4   Applied Cognition Raw Score 24   Applied Cognition Standardized Score 62 21   Modified Ria Scale   Modified [place] Scale 3       Documentation completed by Rosa Arriaga, VIVIAN, OTR/L

## 2021-04-23 NOTE — CONSULTS
Consult Note- Acute Pain Service   Bernadine Harrison 76 y o  male MRN: [id number]  Unit/Bed#: [facility] 633-01 Encounter: [id number]               Assessment/Plan     Assessment:   Patient Active Problem List   Diagnosis    Obstructive sleep apnea    Gastroesophageal reflux disease without esophagitis    Closed fracture of multiple ribs of right side    Pneumothorax on right    Fall      Bernadine Harrison is a 76 y o  male  Admitted to the trauma service last evening after transferring from [facility] following a fall 2 days ago  Patient diagnosed with right 2nd through 5th rib fractures and moderate right pneumothorax  Plan:    Continue Tylenol 975 mg p o  q 8 hours scheduled   Continue oxycodone 2 5 mg p o  q 4 hours p r n  moderate pain   Continue oxycodone 5 mg p o  q 4 hours p r n  severe pain   Continue Dilaudid 0 2 mg IV q 3 hours p r n  breakthrough pain   Continue Robaxin 500 mg p o  q 6 hours scheduled   Continue lidocaine patch for up to 12 hours daily   Continue bowel regimen to avoid opioid induced constipation   Patient agreeable to epidural catheters a shin  Lovenox held this morning  Platelet count normal     APS will continue to follow  Please call  / [phone] or [facility] (/ between [phone] and on weekends) with questions or concerns    History of Present Illness    Admit Date:  4/22/2021  Hospital Day:  1 day  Primary Service:  Trauma  Attending Provider:  Otilia Lyle DO  Reason for Consult / Principal Problem:   Multiple right rib fractures  HPI: Bernadine Harrison is a 76 y o  male who presents with  Right 2nd through 5th rib fractures with moderate right pneumothorax following a fall 2 days ago  Patient complains of worsening right chest wall pain and mild shortness of breath after falling off a ladder approximately 4 ft onto the ground  Patient with no significant past medical history    Currently complaining of 7/10 right chest wall pain  Currently on room air with incentive spirometry reaching 1250 consistently  Current pain location(s):   Right chest wall  Pain Scale:    7/10  Quality:  sharp  Current Analgesic regimen:    Tylenol 975 mg p o  q 8 hours scheduled  Oxycodone 2 5 mg p o  q 4 hours p r n  moderate pain  Oxycodone 5 mg p o  q 4 hours p r n  severe pain  Dilaudid 0 2 mg IV Q 3 hours p r n  breakthrough pain  Robaxin 500 mg p o  q 6 hours scheduled  Lidocaine patch for up to 12 hours daily  Pain History:   No history of chronic pain  Pain Management Provider:   No outpatient pain management provider  I have reviewed the patient's controlled substance dispensing history in the Prescription Drug Monitoring Program in compliance with the [facility] regulations before prescribing any controlled substances  Past 1 year review of PDMP:  Review of PDMP reveals no prescriptions for controlled substances  Inpatient consult to Acute Pain Service  Consult performed by: Franko Hudson PA-C  Consult ordered by: Boo Story MD          Review of Systems   Respiratory: Positive for shortness of breath  Cardiovascular: Positive for chest pain  All other systems reviewed and are negative  Historical Information   History reviewed  No pertinent past medical history  Past Surgical History:   Procedure Laterality Date    APPENDECTOMY      GUM SURGERY       Social History   Social History     Substance and Sexual Activity   Alcohol Use Yes    Frequency: Monthly or less    Drinks per session: 1 or 2    Binge frequency: Never     Social History     Substance and Sexual Activity   Drug Use Never     Social History     Tobacco Use   Smoking Status Never Smoker   Smokeless Tobacco Never Used     Family History: History reviewed  No pertinent family history      Meds/Allergies   all current active meds have been reviewed, current meds:   Current Facility-Administered Medications   Medication Dose Route Frequency    acetaminophen (TYLENOL) tablet 975 mg  975 mg Oral Q8H [address]    aspirin chewable tablet 81 mg  81 mg Oral Daily    docusate sodium (COLACE) capsule 100 mg  100 mg Oral BID    HYDROmorphone HCl (DILAUDID) injection 0 2 mg  0 2 mg Intravenous Q3H PRN    lidocaine (LIDODERM) 5 % patch 1 patch  1 patch Topical Daily    methocarbamol (ROBAXIN) tablet 500 mg  500 mg Oral Q6H [address]    ondansetron (ZOFRAN) injection 4 mg  4 mg Intravenous Q6H PRN    oxyCODONE (ROXICODONE) IR tablet 2 5 mg  2 5 mg Oral Q4H PRN    oxyCODONE (ROXICODONE) IR tablet 5 mg  5 mg Oral Q4H PRN    senna (SENOKOT) tablet 17 2 mg  2 tablet Oral Daily    and PTA meds:   Prior to Admission Medications   Prescriptions Last Dose Informant Patient Reported? Taking? Aspirin Buf,CaCarb-MgCarb-MgO, 81 MG TABS   Yes No   Sig: Take 1 tablet by mouth daily   B Complex Vitamins (B COMPLEX 50 PO)   Yes No   Sig: Take 1 capsule by mouth daily   Multiple Vitamin (multivitamin) capsule   Yes No   Sig: Take by mouth   cholecalciferol (VITAMIN D3) 1,000 units tablet   Yes Yes   Sig: Take 2,000 Units by mouth daily   desloratadine (CLARINEX) 5 MG tablet   No No   Sig: Take 1 tablet (5 mg total) by mouth daily   pseudoephedrine (SUDAFED) 60 mg tablet   Yes Yes   Sig: Take 60 mg by mouth every 4 (four) hours as needed for congestion      Facility-Administered Medications: None       Allergies   Allergen Reactions    Chocolate - Food Allergy Other (See Comments)     Cold sweats    Ranitidine Edema       Objective   Temp:  [97 4 °F (36 3 °C)-98 2 °F (36 8 °C)] 97 4 °F (36 3 °C)  HR:  [53-73] 62  Resp:  [16-26] 17  BP: ()/() 102/65    Intake/Output Summary (Last 24 hours) at 4/23/2021 6134  Last data filed at 4/23/2021 0126  Gross per 24 hour   Intake 360 ml   Output --   Net 360 ml       Physical Exam  Vitals signs and nursing note reviewed  Constitutional:       General: He is awake  He is not in acute distress    Eyes: Conjunctiva/sclera: Conjunctivae normal       Pupils: Pupils are equal, round, and reactive to light  Cardiovascular:      Rate and Rhythm: Normal rate and regular rhythm  Skin:     General: Skin is warm and dry  Neurological:      Mental Status: He is alert and oriented to person, place, and time  GCS: GCS eye subscore is 4  GCS verbal subscore is 5  GCS motor subscore is 6  Psychiatric:         Behavior: Behavior is cooperative  Lab Results:   I have personally reviewed pertinent labs  , CBC:   Lab Results   Component Value Date    WBC 5 06 04/23/2021    HGB 13 2 04/23/2021    HCT 38 8 04/23/2021    MCV 89 04/23/2021     04/23/2021    MCH 30 3 04/23/2021    MCHC 34 0 04/23/2021    RDW 12 5 04/23/2021    MPV 10 1 04/23/2021    NRBC 0 04/22/2021   , CMP:   Lab Results   Component Value Date    SODIUM 142 04/23/2021    K 3 6 04/23/2021     (H) 04/23/2021    CO2 27 04/23/2021    BUN 18 04/23/2021    CREATININE 0 59 (L) 04/23/2021    CALCIUM 8 4 04/23/2021    AST 34 04/22/2021    ALT 46 04/22/2021    ALKPHOS 77 04/22/2021    EGFR 105 04/23/2021   , BMP:  Lab Results   Component Value Date    SODIUM 142 04/23/2021    K 3 6 04/23/2021     (H) 04/23/2021    CO2 27 04/23/2021    BUN 18 04/23/2021    CREATININE 0 59 (L) 04/23/2021    GLUC 95 04/23/2021    CALCIUM 8 4 04/23/2021    AGAP 5 04/23/2021    EGFR 105 04/23/2021   , PT/PTT:No results found for: PT, PTT    Imaging Studies: I have personally reviewed pertinent reports  EKG, Pathology, and Other Studies: I have personally reviewed pertinent reports  Counseling / Coordination of Care  Total floor / unit time spent today Level 5 = 110 minutes  Greater than 50% of total time was spent with the patient and / or family counseling and / or coordination of care   A description of the counseling / coordination of care:  Patient interview, physical examination, review of PDMP, review of medical record, review of imaging and laboratory data, development of pain management plan, discussion of pain management plan with patient and primary service  Please note that the APS provides consultative services regarding pain management only  With the exception of ketamine and epidural infusions and except when indicated, final decisions regarding starting or changing doses of analgesic medications are at the discretion of the consulting service  Off hours consultation and/or medication management is generally not available      Rayray Brandon PA-C  Acute Pain Service

## 2021-04-23 NOTE — UTILIZATION REVIEW
Unable to submit via [address] [place] [address]; Per [facility] - No records found matching the search options   for this [facility] Plan      Initial Clinical Review    Admission: Date/Time/Statement:   Admission Orders (From admission, onward)     Ordered        04/22/21 1910  Inpatient Admission  Once                   Orders Placed This Encounter   Procedures    Inpatient Admission     Standing Status:   Standing     Number of Occurrences:   1     Order Specific Question:   Level of Care     Answer:   Med Surg [16]     Order Specific Question:   Estimated length of stay     Answer:   More than 2 Midnights     Order Specific Question:   Certification     Answer:   I certify that inpatient services are medically necessary for this patient for a duration of greater than two midnights  See H&P and MD Progress Notes for additional information about the patient's course of treatment  ED Arrival Information     Expected Arrival Acuity Means of Arrival Escorted By Service Admission Type    4/22/2021 4/22/2021 20:02 [facility] Trauma Emergency    Arrival Complaint    Fall, [place]         Chief Complaint   Patient presents with    Fall     reference trauma documentation     Initial Presentation:   65y Male, transfer from [facility] to [place] [facility] presents D/p Fall 2 days ago with right 2nd-5th rib fractures and associated pneumothorax  He fell  Off a ladder from 4 ft onto the ground hitting his right side  Episode of shortness of breath but resolved but continue with right chest wall pain that is pleuritic and progressing in severity  Denies head strike or LOC  On aspirin  EKG with [facility]R at [facility]  Admit Inpatient level of care for S/p Fall, Right 2nd-5th rib fractures and Right pneumothorax  Repeat CXR in am 4/23  Incentive spirometry and pulmonary toilet  Pain control  Acute Pain Service consult  Continuous pulse ox  On exam; Decreased breath sounds on right compared to left   Right sided chest wall tenderness  Date: 4/23   Day 2:   Acute Pain Service cons; Continue schedule Tylenol, Robaxin and Lidocaine  Prn Oxycodone and Dilaudid  Pt agreeable to epidural catheters  Lovenox held this morning  Progress notes;  Currently pulling 1250 mL on incentive spirometry  Currently on O2 2L NC at 98%, maintain saturations greater than or equal to 94%  Aggressive pulmonary hygiene  Continuous pulse ox  Repeat CXR today: persistent pneumothorax  Repeat CXR in am 4/24  Chest tube placed today to continuous wall suction  ED Triage Vitals   Temperature Pulse Respirations Blood Pressure SpO2   04/22/21 2011 04/22/21 2011 04/22/21 2011 04/22/21 2011 04/22/21 2011   97 9 °F (36 6 °C) (!) 53 22 169/84 96 %      Temp Source Heart Rate Source Patient Position - Orthostatic VS BP Location FiO2 (%)   04/22/21 2011 04/22/21 2011 04/23/21 0736 04/23/21 0736 --   Oral Monitor Lying Left arm       Pain Score       04/22/21 2013       No Pain          Wt Readings from Last 1 Encounters:   04/22/21 71 2 kg (157 lb)     Additional Vital Signs:   04/23/21 1102  --  --  --  --  --  --  28  2 L/min   Nasal cannula  --   Nasal Cannula O2 Flow Rate (L/min): as per requested by marc thomas pac with trauma at 04/23/21 1102   04/23/21 0745  --  --  --  102/65  --  --  --  --  --  Lying   04/23/21 0736  97 4 °F (36 3 °C)Abnormal   62  17  98/64  --  95 %  --  --  None (Room air)  Lying   04/23/21 0726  --  --  --  --  --  95 %  --  --  None (Room air)  --   04/22/21 22:43:47  97 6 °F (36 4 °C)  56  16  120/83  95  96 %  --  --  --  --   04/22/21 21:05:02  --  73  18  166/112Abnormal   130  96 %  --  --  --       Pertinent Labs/Diagnostic Test Results:   4/22   CT Head - No acute intracranial abnormality  CT Chest/ Abd/ Pelvis -  Multiple right nondisplaced 2nd through 5th lateral rib fractures with moderate-sized pneumothorax  No mediastinal shift    Small right pleural effusion with moderate partial atelectasis of the right middle and lower lobes  Punctate focus of gas above the right diaphragm near the cardiophrenic sulcus  No pneumoperitoneum  PCXR - Persistent moderate right pneumothorax  4/23  PCXR - Persistent moderate right pneumothorax                Results from last 7 days   Lab Units 04/23/21  0541 04/22/21  1634   WBC Thousand/uL 5 06 4 83   HEMOGLOBIN g/dL 13 2 13 1   HEMATOCRIT % 38 8 39 1   PLATELETS Thousands/uL 177 188   NEUTROS ABS Thousands/µL  --  2 59         Results from last 7 days   Lab Units 04/23/21  0541 04/22/21  1634   SODIUM mmol/L 142 143   POTASSIUM mmol/L 3 6 3 7   CHLORIDE mmol/L 110* 106   CO2 mmol/L 27 30   ANION GAP mmol/L 5 7   BUN mg/dL 18 24   CREATININE mg/dL 0 59* 0 80   EGFR ml/min/1 73sq m 105 93   CALCIUM mg/dL 8 4 8 6   MAGNESIUM mg/dL 2 2  --    PHOSPHORUS mg/dL 3 7  --      Results from last 7 days   Lab Units 04/22/21  1634   AST U/L 34   ALT U/L 46   ALK PHOS U/L 77   TOTAL PROTEIN g/dL 6 8   ALBUMIN g/dL 3 7   TOTAL BILIRUBIN mg/dL 0 61   BILIRUBIN DIRECT mg/dL 0 17         Results from last 7 days   Lab Units 04/23/21  0541 04/22/21  1634   GLUCOSE RANDOM mg/dL 95 111       Results from last 7 days   Lab Units 04/22/21  1634   TROPONIN I ng/mL <0 02       ED Treatment:   Medication Administration from 04/22/2021 1902 to 04/22/2021 2053       Date/Time Order Dose Route Action     04/22/2021 2032 docusate sodium (COLACE) capsule 100 mg 100 mg Oral Given     04/22/2021 2031 methocarbamol (ROBAXIN) tablet 500 mg 500 mg Oral Given     04/22/2021 2032 oxyCODONE (ROXICODONE) IR tablet 5 mg 5 mg Oral Given        History reviewed  No pertinent past medical history    Present on Admission:   Closed fracture of multiple ribs of right side   Pneumothorax on right      Admitting Diagnosis: Fall, Pneumothorax  Age/Sex: 76 y o  male     Admission Orders:  Scheduled Medications:  acetaminophen, 975 mg, Oral, Q8H [facility]  docusate sodium, 100 mg, Oral, BID  lidocaine, 1 patch, Topical, Daily  lidocaine (PF), 20 mL, Infiltration, Once  methocarbamol, 500 mg, Oral, Q6H NATY  senna, 2 tablet, Oral, Daily      Continuous IV Infusions: None     PRN Meds:  HYDROmorphone, 0 2 mg, Intravenous, Q3H PRN  ondansetron, 4 mg, Intravenous, Q6H PRN  oxyCODONE, 2 5 mg, Oral, Q4H PRN  oxyCODONE, 5 mg, Oral, Q4H PRN 4/23 x1      Chest tube to suction  Continuous Pulse ox  IP CONSULT TO CASE MANAGEMENT  IP CONSULT TO ACUTE PAIN SERVICE    Network Utilization Review Department  ATTENTION: Please call with any questions or concerns to [phone] and carefully listen to the prompts so that you are directed to the right person  All voicemails are confidential   Lenore Parsons all requests for admission clinical reviews, approved or denied determinations and any other requests to dedicated fax number below belonging to the campus where the patient is receiving treatment   List of dedicated fax numbers for the Facilities:  [address] DENIALS (Administrative/Medical Necessity) [phone]   [address] (Maternity/NICU/Pediatrics) [phone]   [address] [place] [address] [address] [place] [phone] [place] [address] [address] [facility] [place] [address] [phone]

## 2021-04-23 NOTE — PHYSICAL THERAPY NOTE
PHYSICAL THERAPY EVALUATION  NAME:  Jet Li  DATE: 04/23/21    AGE:   76 y o  Mrn:   [id number]  ADMIT DX:  Fall, Pneumothorax    History reviewed  No pertinent past medical history  Past Surgical History:   Procedure Laterality Date    APPENDECTOMY      GUM SURGERY         Length Of Stay: 1    PHYSICAL THERAPY EVALUATION:        04/23/21 0908   Note Type   Note type Evaluation   Pain Assessment   Pain Assessment Tool 0-10   Pain Score 7   Pain Location/Orientation Orientation: Right;Location: Rib Cage   Pain Onset/Description Onset: Ongoing;Frequency: Constant/Continuous; Descriptor: Aching   Effect of Pain on Daily Activities Increased pain with activity   Patient's Stated Pain Goal No pain   Hospital Pain Intervention(s) Ambulation/increased activity;Repositioned   Home Living   Type of [facility] Two level;Bed/bath upstairs; Ramped entrance  (Stair glide to 2nd floor)   Home Equipment Walker;Cane;Wheelchair-manual   Additional Comments Patient reports living with spouse who patient states he is the primary caregiver for  Patient reports that his spouse is primarily wheelchair-bound  Patient also has a local daughter who is able to assist him as well as his wife as needed at time of discharge   Pts daughter is assisting his wife while he is in the hospital    Prior Function   Level of [place] Independent with ADLs and functional mobility   Lives With Spouse   Receives Help From Family   ADL Assistance Independent   Falls in the last 6 months 1 to 4  (1; reason for this admission)   Comments Patient denies the use of an assistive device for ambulation prior to admission   Restrictions/Precautions   Weight Bearing Precautions Per Order No   Other Precautions Pain;O2  (2L O2 via NC)   General   Additional Pertinent History During ambulation pts O2 sats were 95-97% on 2L    Family/Caregiver Present No   Cognition   Overall Cognitive Status [facility]   Arousal/Participation Alert Orientation Level Oriented to person;Oriented to place;Oriented to time   Memory Within functional limits   Following Commands Follows one step commands without difficulty   RUE Assessment   RUE Assessment WFL   LUE Assessment   LUE Assessment WFL   RLE Assessment   RLE Assessment WFL   LLE Assessment   LLE Assessment WFL   Bed Mobility   Supine to Sit 5  Supervision   Transfers   Sit to Stand 5  Supervision   Stand to Sit 5  Supervision   Ambulation/Elevation   Gait pattern WNL   Gait Assistance 5  Supervision   Assistive Device None   Distance 150ft    Stair Management Assistance 5  Supervision   Stair Management Technique One rail R   Number of Stairs 3   Balance   Static Sitting Fair +   Static Standing Fair   Ambulatory Fair -   Endurance Deficit   Endurance Deficit No   Activity Tolerance   Activity Tolerance Patient tolerated treatment well   Medical Staff Made Aware Chise, OT    Nurse Made Aware Patient appropriate to be seen and mobilized per nursing   Assessment   Prognosis Good   Problem List Decreased mobility;Pain   Assessment Pt is 76 y o  male seen for PT evaluation s/p admit to [facility] on 4/22/2021  Two pt identifiers were used to confirm  Pt presented s/p fall off of ladder at home on Tuesday  Patient reports he continued to have increased pain and presented to the ED  [facility] and was transferred to [facility] for further medical management/evaluation  Pt was admitted with a primary dx of:  Closed fracture of multiple ribs on right, pneumothorax on right  PT now consulted for assessment of mobility and d/c needs  Pt with OOB to chair orders  Pts current co morbidities affecting treatment include:  No past medical history on and personal factors including being the primary caregiver for his spouse   Pts current clinical presentation is Evolving (medium complexity) due to Ongoing medical management for primary dx, Increased O2 via NC from pts baseline, Continuous pulse oximetry monitoring     Prior to admission, pt was independent with ambulation without the use of an assistive device as per patient  Upon evaluation, pt currently is requiring Supervision for bed mobility; Supervision for transfers and Supervision for ambulation w/ no AD   Pt presents at PT eval functioning below baseline and currently w/ overall mobility deficits 2* to: decreased endurance, pain, decreased activity tolerance compared to baseline  nctional mobility; for ongoing pt/ family training; and DME needs  At conclusion of PT session pt returned back in chair with phone and call bell within reach  Pt denies any further questions at this time  PT is currently recommending Home with increased family support  D/C acute care PT at this time due to pt being near baseline in terms of functional mobility  Pt denies any mobility or safety concerns about returning home at d/c  Recommend pt continues to mobilize with nsg and restorative techs during hospital stay     Barriers to Discharge None   Barriers to Discharge Comments Patient denies any mobility or safety concerns about returning home at time of discharge   Goals   Patient Goals " to go home"   Plan   PT Frequency Other (Comment)  (D/C PT )   Recommendation   PT Discharge Recommendation No rehabilitation needs   Equipment Recommended   (none at this time)   PT - OK to Discharge Yes  (When medically cleared)   Additional Comments Patient denies any mobility or safety concerns about returning home at time of discharge   Tim 8 in Bed Without Bedrails 4   Lying on Back to Sitting on Edge of Flat Bed 4   Moving Bed to Chair 4   Standing Up From Chair 4   Walk in Room 4   Climb 3-5 Stairs 4   Basic Mobility Inpatient Raw Score 24   Basic Mobility Standardized Score 57 68   Modified Ria Scale   Modified [place] Scale 3   Barthel Index   Feeding 10   Bathing 5   Grooming Score 5   Dressing Score 10   Bladder Score 10   Bowels Score 10   Toilet Use Score 10   Transfers (Bed/Chair) Score 15   Mobility (Level Surface) Score 15   Stairs Score 10   Barthel Index Score 100   Portions of the documentation may have been created using voice recognition software  Occasional wrong word or sound alike substitutions may have occurred due to the inherent limitations of the voice recognition software  Read the chart carefully and recognize, using context, where substitutions have occurred      Teresa Gonzalez, PT, DPT

## 2021-04-23 NOTE — PROCEDURES
Chest Tube Insertion    Date/Time: 4/23/2021 1:27 PM  Performed by: Rachel Harp PA-C  Authorized by: Rachel Harp PA-C     Patient location:  Bedside  Other Assisting Provider: Yes (comment) MD Hiren Thurman PA-C Lisbeth Moos, MD )    Consent:     Consent obtained:  Written    Consent given by:  Patient    Risks discussed:  Bleeding, incomplete drainage, nerve damage, pain, infection and damage to surrounding structures    Alternatives discussed:  No treatment and observation  Universal protocol:     Procedure explained and questions answered to patient or proxy's satisfaction: yes      Radiology Images displayed and confirmed  If images not available, report reviewed: yes      Site/side marked: yes    Pre-procedure details:     Skin preparation:  ChloraPrep    Preparation: Patient was prepped and draped in the usual sterile fashion    Indications:     Indications: pneumothroax    Anesthesia (see MAR for exact dosages): Anesthesia method:  Local infiltration    Local anesthetic:  Lidocaine 1% w/o epi  Procedure details:     Placement location:  Lateral    Laterality:  Right    Approach:  Percutaneous    Scalpel size:  11    Access kit used: 12 Fr  Tube size (Fr):  12    Tube connected to:  Suction    Drainage characteristics: Immediate output of air, serosanguinous fluid  Suture material:  2-0 silk    Dressing:  4x4 sterile gauze  Post-procedure details:     Patient tolerance of procedure:   Tolerated well, no immediate complications

## 2021-04-23 NOTE — PLAN OF CARE
Problem: Potential for Falls  Goal: Patient will remain free of falls  Description: INTERVENTIONS:  - Assess patient frequently for physical needs  -  Identify cognitive and physical deficits and behaviors that affect risk of falls    -  [place] fall precautions as indicated by assessment   - Educate patient/family on patient safety including physical limitations  - Instruct patient to call for assistance with activity based on assessment  - Modify environment to reduce risk of injury  - Consider OT/PT consult to assist with strengthening/mobility  Outcome: Progressing     Problem: RESPIRATORY - ADULT  Goal: Achieves optimal ventilation and oxygenation  Description: INTERVENTIONS:  - Assess for changes in respiratory status  - Assess for changes in mentation and behavior  - Position to facilitate oxygenation and minimize respiratory effort  - Oxygen administered by appropriate delivery if ordered  - Initiate smoking cessation education as indicated  - Encourage broncho-pulmonary hygiene including cough, deep breathe, Incentive Spirometry  - Assess the need for suctioning and aspirate as needed  - Assess and instruct to report SOB or any respiratory difficulty  - Respiratory Therapy support as indicated  Outcome: Progressing     Problem: MUSCULOSKELETAL - ADULT  Goal: Maintain or return mobility to safest level of function  Description: INTERVENTIONS:  - Assess patient's ability to carry out ADLs; assess patient's baseline for ADL function and identify physical deficits which impact ability to perform ADLs (bathing, care of mouth/teeth, toileting, grooming, dressing, etc )  - Assess/evaluate cause of self-care deficits   - Assess range of motion  - Assess patient's mobility  - Assess patient's need for assistive devices and provide as appropriate  - Encourage maximum independence but intervene and supervise when necessary  - Involve family in performance of ADLs  - Assess for home care needs following discharge - Consider OT consult to assist with ADL evaluation and planning for discharge  - Provide patient education as appropriate  Outcome: Progressing  Goal: Maintain proper alignment of affected body part  Description: INTERVENTIONS:  - Support, maintain and protect limb and body alignment  - Provide patient/ family with appropriate education  Outcome: Progressing     Problem: PAIN - ADULT  Goal: Verbalizes/displays adequate comfort level or baseline comfort level  Description: Interventions:  - Encourage patient to monitor pain and request assistance  - Assess pain using appropriate pain scale  - Administer analgesics based on type and severity of pain and evaluate response  - Implement non-pharmacological measures as appropriate and evaluate response  - Consider cultural and social influences on pain and pain management  - Notify physician/advanced practitioner if interventions unsuccessful or patient reports new pain  Outcome: Progressing     Problem: INFECTION - ADULT  Goal: Absence or prevention of progression during hospitalization  Description: INTERVENTIONS:  - Assess and monitor for signs and symptoms of infection  - Monitor lab/diagnostic results  - Monitor all insertion sites, i e  indwelling lines, tubes, and drains  - Monitor endotracheal if appropriate and nasal secretions for changes in amount and color  - [place] appropriate cooling/warming therapies per order  - Administer medications as ordered  - Instruct and encourage patient and family to use good hand hygiene technique  - Identify and instruct in appropriate isolation precautions for identified infection/condition  Outcome: Progressing     Problem: SAFETY ADULT  Goal: Patient will remain free of falls  Description: INTERVENTIONS:  - Assess patient frequently for physical needs  -  Identify cognitive and physical deficits and behaviors that affect risk of falls    -  [place] fall precautions as indicated by assessment   - Educate patient/family on patient safety including physical limitations  - Instruct patient to call for assistance with activity based on assessment  - Modify environment to reduce risk of injury  - Consider OT/PT consult to assist with strengthening/mobility  Outcome: Progressing  Goal: Maintain or return to baseline ADL function  Description: INTERVENTIONS:  -  Assess patient's ability to carry out ADLs; assess patient's baseline for ADL function and identify physical deficits which impact ability to perform ADLs (bathing, care of mouth/teeth, toileting, grooming, dressing, etc )  - Assess/evaluate cause of self-care deficits   - Assess range of motion  - Assess patient's mobility; develop plan if impaired  - Assess patient's need for assistive devices and provide as appropriate  - Encourage maximum independence but intervene and supervise when necessary  - Involve family in performance of ADLs  - Assess for home care needs following discharge   - Consider OT consult to assist with ADL evaluation and planning for discharge  - Provide patient education as appropriate  Outcome: Progressing  Goal: Maintain or return mobility status to optimal level  Description: INTERVENTIONS:  - Assess patient's baseline mobility status (ambulation, transfers, stairs, etc )    - Identify cognitive and physical deficits and behaviors that affect mobility  - Identify mobility aids required to assist with transfers and/or ambulation (gait belt, sit-to-stand, lift, walker, cane, etc )  - [place] fall precautions as indicated by assessment  - Record patient progress and toleration of activity level on Mobility SBAR; progress patient to next Phase/Stage  - Instruct patient to call for assistance with activity based on assessment  - Consider rehabilitation consult to assist with strengthening/weightbearing, etc   Outcome: Progressing     Problem: DISCHARGE PLANNING  Goal: Discharge to home or other facility with appropriate resources  Description: INTERVENTIONS:  - Identify barriers to discharge w/patient and caregiver  - Arrange for needed discharge resources and transportation as appropriate  - Identify discharge learning needs (meds, wound care, etc )  - Arrange for interpretive services to assist at discharge as needed  - Refer to Case Management Department for coordinating discharge planning if the patient needs post-hospital services based on physician/advanced practitioner order or complex needs related to functional status, cognitive ability, or social support system  Outcome: Progressing     Problem: Knowledge Deficit  Goal: Patient/family/caregiver demonstrates understanding of disease process, treatment plan, medications, and discharge instructions  Description: Complete learning assessment and assess knowledge base    Interventions:  - Provide teaching at level of understanding  - Provide teaching via preferred learning methods  Outcome: Progressing

## 2021-04-24 ENCOUNTER — APPOINTMENT (INPATIENT)
Dept: RADIOLOGY | Facility: HOSPITAL | Age: 66
DRG: 094 | End: 2021-04-24
Payer: OTHER MISCELLANEOUS

## 2021-04-24 LAB
ANION GAP SERPL CALCULATED.3IONS-SCNC: 3 MMOL/L (ref 4–13)
BASOPHILS # BLD AUTO: 0.05 THOUSANDS/ΜL (ref 0–0.1)
BASOPHILS NFR BLD AUTO: 1 % (ref 0–1)
BUN SERPL-MCNC: 22 MG/DL (ref 5–25)
CALCIUM SERPL-MCNC: 8.6 MG/DL (ref 8.3–10.1)
CHLORIDE SERPL-SCNC: 110 MMOL/L (ref 100–108)
CO2 SERPL-SCNC: 27 MMOL/L (ref 21–32)
CREAT SERPL-MCNC: 0.64 MG/DL (ref 0.6–1.3)
EOSINOPHIL # BLD AUTO: 0.34 THOUSAND/ΜL (ref 0–0.61)
EOSINOPHIL NFR BLD AUTO: 6 % (ref 0–6)
ERYTHROCYTE [DISTWIDTH] IN BLOOD BY AUTOMATED COUNT: 12.7 % (ref 11.6–15.1)
GFR SERPL CREATININE-BSD FRML MDRD: 102 ML/MIN/1.73SQ M
GLUCOSE SERPL-MCNC: 100 MG/DL (ref 65–140)
HCT VFR BLD AUTO: 41.8 % (ref 36.5–49.3)
HGB BLD-MCNC: 14 G/DL (ref 12–17)
IMM GRANULOCYTES # BLD AUTO: 0.02 THOUSAND/UL (ref 0–0.2)
IMM GRANULOCYTES NFR BLD AUTO: 0 % (ref 0–2)
LYMPHOCYTES # BLD AUTO: 1.6 THOUSANDS/ΜL (ref 0.6–4.47)
LYMPHOCYTES NFR BLD AUTO: 28 % (ref 14–44)
MCH RBC QN AUTO: 29.5 PG (ref 26.8–34.3)
MCHC RBC AUTO-ENTMCNC: 33.5 G/DL (ref 31.4–37.4)
MCV RBC AUTO: 88 FL (ref 82–98)
MONOCYTES # BLD AUTO: 0.61 THOUSAND/ΜL (ref 0.17–1.22)
MONOCYTES NFR BLD AUTO: 11 % (ref 4–12)
NEUTROPHILS # BLD AUTO: 3.16 THOUSANDS/ΜL (ref 1.85–7.62)
NEUTS SEG NFR BLD AUTO: 54 % (ref 43–75)
NRBC BLD AUTO-RTO: 0 /100 WBCS
PLATELET # BLD AUTO: 198 THOUSANDS/UL (ref 149–390)
PMV BLD AUTO: 10 FL (ref 8.9–12.7)
POTASSIUM SERPL-SCNC: 3.7 MMOL/L (ref 3.5–5.3)
RBC # BLD AUTO: 4.74 MILLION/UL (ref 3.88–5.62)
SODIUM SERPL-SCNC: 140 MMOL/L (ref 136–145)
WBC # BLD AUTO: 5.78 THOUSAND/UL (ref 4.31–10.16)

## 2021-04-24 PROCEDURE — 80048 BASIC METABOLIC PNL TOTAL CA: CPT | Performed by: SURGERY

## 2021-04-24 PROCEDURE — 94668 MNPJ CHEST WALL SBSQ: CPT

## 2021-04-24 PROCEDURE — 71046 X-RAY EXAM CHEST 2 VIEWS: CPT

## 2021-04-24 PROCEDURE — 85025 COMPLETE CBC W/AUTO DIFF WBC: CPT | Performed by: SURGERY

## 2021-04-24 PROCEDURE — 99231 SBSQ HOSP IP/OBS SF/LOW 25: CPT | Performed by: SURGERY

## 2021-04-24 PROCEDURE — 71045 X-RAY EXAM CHEST 1 VIEW: CPT

## 2021-04-24 RX ADMIN — OXYCODONE HYDROCHLORIDE 5 MG: 5 TABLET ORAL at 17:21

## 2021-04-24 RX ADMIN — METHOCARBAMOL 500 MG: 500 TABLET, FILM COATED ORAL at 05:23

## 2021-04-24 RX ADMIN — OXYCODONE HYDROCHLORIDE 5 MG: 5 TABLET ORAL at 08:26

## 2021-04-24 RX ADMIN — METHOCARBAMOL 500 MG: 500 TABLET, FILM COATED ORAL at 23:52

## 2021-04-24 RX ADMIN — METHOCARBAMOL 500 MG: 500 TABLET, FILM COATED ORAL at 17:21

## 2021-04-24 RX ADMIN — SENNOSIDES 17.2 MG: 8.6 TABLET, FILM COATED ORAL at 08:27

## 2021-04-24 RX ADMIN — ACETAMINOPHEN 975 MG: 325 TABLET ORAL at 22:02

## 2021-04-24 RX ADMIN — ENOXAPARIN SODIUM 30 MG: 30 INJECTION SUBCUTANEOUS at 22:02

## 2021-04-24 RX ADMIN — LIDOCAINE 1 PATCH: 50 PATCH TOPICAL at 08:27

## 2021-04-24 RX ADMIN — GABAPENTIN 100 MG: 100 CAPSULE ORAL at 08:26

## 2021-04-24 RX ADMIN — GABAPENTIN 100 MG: 100 CAPSULE ORAL at 22:02

## 2021-04-24 RX ADMIN — ACETAMINOPHEN 975 MG: 325 TABLET ORAL at 14:19

## 2021-04-24 RX ADMIN — METHOCARBAMOL 500 MG: 500 TABLET, FILM COATED ORAL at 11:50

## 2021-04-24 RX ADMIN — OXYCODONE HYDROCHLORIDE 5 MG: 5 TABLET ORAL at 23:51

## 2021-04-24 RX ADMIN — GABAPENTIN 100 MG: 100 CAPSULE ORAL at 17:20

## 2021-04-24 RX ADMIN — DOCUSATE SODIUM 100 MG: 100 CAPSULE, LIQUID FILLED ORAL at 08:27

## 2021-04-24 RX ADMIN — ACETAMINOPHEN 975 MG: 325 TABLET ORAL at 05:24

## 2021-04-24 NOTE — PROGRESS NOTES
[facility]  Progress Note Denise Orellana 1955, 76 y o  male MRN: [id number]  Unit/Bed#: [facility] 633-01 Encounter: [id number]  Primary Care Provider: Kinga Mckeon DO   Date and time admitted to hospital: 4/22/2021  8:02 PM    * Pneumothorax on right  Assessment & Plan  4/22 CT CAP: Multiple right nondisplaced 2nd through 5th lateral rib fractures with moderate-sized pneumothorax  No mediastinal shift  Small right pleural effusion with moderate partial atelectasis of the right middle and lower lobes  · Status post fall 4/20   · Hemodynamically stable, spo2 >94% on RA   · Currently on 2 L O2 with 98%  · Aggressive pulmonary hygiene  · Respiratory protocol   · Continuous pulse ox  · Repeat CXR in AM 4/23: persistent pneumothorax  · Right chest tube placed 4/23   · Repeat CXR in AM 4/24: no pneumothorax identified   · Chest tube placed to water seal, repeat CXR in AM 4/25    Fall  Assessment & Plan  · Fall from ladder  · Injuries as listed   · PT/OT recommending discharge home    Closed fracture of multiple ribs of right side  Assessment & Plan  - Multiple right-sided rib fractures (2-5), present on admission   - Continue rib fracture protocol   - Continue to encourage incentive spirometer use and adequate pulmonary hygiene  Currently pulling 1250 mL on I S   - Continue multimodal analgesic regimen  Appreciate APS evaluation and recommendations   - Supplemental oxygen via nasal cannula as needed to maintain saturations greater than or equal to 94%  Currently on 2 L of oxygen at 98%  - Repeat chest x-ray from 4/23 reviewed: persistent right PTX  - PT and OT evaluation and treatment as indicated  Recommending DC home  - Repeat chest x-ray 4/25  - Outpatient follow-up in the trauma clinic for re-evaluation in approximately 2 weeks          Disposition:  Continue med surge level care, right chest tube placed to water seal today, repeat chest x-ray in a m , DC pending medical stability      SUBJECTIVE:  Chief Complaint:  Doing well    Subjective:  No acute events overnight  Per nursing staff, chest tube was noted to have continue was bubbling but resolved once tubing connections were tightened  Continues to have trouble sleeping and lying flat but pain overall has significantly improved  Right chest tube output 24 hours:  52 cc serosanguineous    OBJECTIVE:     Meds/Allergies     Current Facility-Administered Medications:     acetaminophen (TYLENOL) tablet 975 mg, 975 mg, Oral, Q8H [address], Brodie Lockett MD, 975 mg at 04/24/21 1419    docusate sodium (COLACE) capsule 100 mg, 100 mg, Oral, BID, Brodie Lockett MD, 100 mg at 04/24/21 0827    gabapentin (NEURONTIN) capsule 100 mg, 100 mg, Oral, TID, Tobi Delgadillo PA-C, 100 mg at 04/24/21 0826    HYDROmorphone HCl (DILAUDID) injection 0 2 mg, 0 2 mg, Intravenous, Q3H PRN, Brodie Lockett MD    lidocaine (LIDODERM) 5 % patch 1 patch, 1 patch, Topical, Daily, Brodie Lockett MD, 1 patch at 04/24/21 0827    methocarbamol (ROBAXIN) tablet 500 mg, 500 mg, Oral, Q6H [address], Brodie Lockett MD, 500 mg at 04/24/21 1150    ondansetron (ZOFRAN) injection 4 mg, 4 mg, Intravenous, Q6H PRN, Brodie Lockett MD    oxyCODONE (ROXICODONE) IR tablet 2 5 mg, 2 5 mg, Oral, Q4H PRN, Brodie Lockett MD    oxyCODONE (ROXICODONE) IR tablet 5 mg, 5 mg, Oral, Q4H PRN, Brodie Lockett MD, 5 mg at 04/24/21 0826    senna (SENOKOT) tablet 17 2 mg, 2 tablet, Oral, Daily, Brodie Lockett MD, 17 2 mg at 04/24/21 0827     Vitals:   Vitals:    04/24/21 0654   BP: 118/75   Pulse: 58   Resp: 18   Temp: 98 °F (36 7 °C)   SpO2: 98%       Intake/Output:  I/O       04/22 0701 - 04/23 0700 04/23 0701 - 04/24 0700 04/24 0701 - 04/25 0700    P  O  360 840 240    Total Intake(mL/kg) 360 (5 1) 840 (11 8) 240 (3 4)    Urine (mL/kg/hr)  825 (0 5) 0 (0)    Chest Tube  52 0    Total Output  877 0    Net +360 -37 +240           Unmeasured Urine Occurrence 1 x 1 x 1 x           Physical Exam:   Physical Exam  Constitutional:       General: He is not in acute distress  Appearance: Normal appearance  He is not ill-appearing  HENT:      Head: Normocephalic and atraumatic  Right Ear: External ear normal       Left Ear: External ear normal       Nose: Nose normal       Mouth/Throat:      Mouth: Mucous membranes are moist       Pharynx: Oropharynx is clear  Eyes:      Extraocular Movements: Extraocular movements intact  Pupils: Pupils are equal, round, and reactive to light  Neck:      Musculoskeletal: Normal range of motion  No muscular tenderness  Cardiovascular:      Rate and Rhythm: Normal rate and regular rhythm  Pulses: Normal pulses  Heart sounds: Normal heart sounds  Comments: Right chest tube in place and to water seal, dressings clean dry and intact  Pulmonary:      Effort: Pulmonary effort is normal       Breath sounds: Normal breath sounds  Chest:      Chest wall: Tenderness present  Abdominal:      General: Abdomen is flat  Palpations: Abdomen is soft  Tenderness: There is no abdominal tenderness  Musculoskeletal:         General: No swelling  Skin:     General: Skin is warm  Capillary Refill: Capillary refill takes less than 2 seconds  Neurological:      General: No focal deficit present  Mental Status: He is alert and oriented to person, place, and time  Mental status is at baseline  Psychiatric:         Mood and Affect: Mood normal            Invasive Devices     Peripheral Intravenous Line            Peripheral IV 04/22/21 Right Antecubital 1 day          Drain            Chest Tube Right 1 day                 Lab Results: Results: I have personally reviewed pertinent reports  Imaging/EKG Studies: Results: I have personally reviewed pertinent reports      Other Studies:  None  VTE Prophylaxis: Sequential compression device (Venodyne)  and Enoxaparin (Lovenox)

## 2021-04-24 NOTE — ASSESSMENT & PLAN NOTE
- Multiple right-sided rib fractures (2-5), present on admission   - Continue rib fracture protocol   - Continue to encourage incentive spirometer use and adequate pulmonary hygiene  Currently pulling 1250 mL on I S   - Continue multimodal analgesic regimen  Appreciate APS evaluation and recommendations   - Supplemental oxygen via nasal cannula as needed to maintain saturations greater than or equal to 94%  Currently on 2 L of oxygen at 98%  - Repeat chest x-ray from 4/23 reviewed: persistent right PTX  - PT and OT evaluation and treatment as indicated  Recommending DC home  - Repeat chest x-ray 4/25  - Outpatient follow-up in the trauma clinic for re-evaluation in approximately 2 weeks  96

## 2021-04-24 NOTE — NURSING NOTE
Pt's chest tube noted to have continuous bubbling  Message sent to trauma resident  CXR ordered  Will continue to monitor

## 2021-04-24 NOTE — ASSESSMENT & PLAN NOTE
4/22 CT CAP: Multiple right nondisplaced 2nd through 5th lateral rib fractures with moderate-sized pneumothorax  No mediastinal shift  Small right pleural effusion with moderate partial atelectasis of the right middle and lower lobes    · Status post fall 4/20   · Hemodynamically stable, spo2 >94% on RA   · Currently on 2 L O2 with 98%  · Aggressive pulmonary hygiene  · Respiratory protocol   · Continuous pulse ox  · Repeat CXR in AM 4/23: persistent pneumothorax  · Right chest tube placed 4/23   · Repeat CXR in AM 4/24: no pneumothorax identified   · Chest tube placed to water seal, repeat CXR in AM 4/25

## 2021-04-25 ENCOUNTER — APPOINTMENT (INPATIENT)
Dept: RADIOLOGY | Facility: HOSPITAL | Age: 66
DRG: 094 | End: 2021-04-25
Payer: OTHER MISCELLANEOUS

## 2021-04-25 VITALS
WEIGHT: 156.97 LBS | HEIGHT: 67 IN | SYSTOLIC BLOOD PRESSURE: 120 MMHG | DIASTOLIC BLOOD PRESSURE: 68 MMHG | BODY MASS INDEX: 24.64 KG/M2 | OXYGEN SATURATION: 95 % | RESPIRATION RATE: 16 BRPM | HEART RATE: 68 BPM | TEMPERATURE: 98.1 F

## 2021-04-25 LAB
ANION GAP SERPL CALCULATED.3IONS-SCNC: 1 MMOL/L (ref 4–13)
BUN SERPL-MCNC: 27 MG/DL (ref 5–25)
CALCIUM SERPL-MCNC: 8.6 MG/DL (ref 8.3–10.1)
CHLORIDE SERPL-SCNC: 109 MMOL/L (ref 100–108)
CO2 SERPL-SCNC: 31 MMOL/L (ref 21–32)
CREAT SERPL-MCNC: 0.7 MG/DL (ref 0.6–1.3)
ERYTHROCYTE [DISTWIDTH] IN BLOOD BY AUTOMATED COUNT: 12.5 % (ref 11.6–15.1)
GFR SERPL CREATININE-BSD FRML MDRD: 98 ML/MIN/1.73SQ M
GLUCOSE SERPL-MCNC: 106 MG/DL (ref 65–140)
HCT VFR BLD AUTO: 42.3 % (ref 36.5–49.3)
HGB BLD-MCNC: 14.2 G/DL (ref 12–17)
MCH RBC QN AUTO: 30.2 PG (ref 26.8–34.3)
MCHC RBC AUTO-ENTMCNC: 33.6 G/DL (ref 31.4–37.4)
MCV RBC AUTO: 90 FL (ref 82–98)
PLATELET # BLD AUTO: 206 THOUSANDS/UL (ref 149–390)
PMV BLD AUTO: 10 FL (ref 8.9–12.7)
POTASSIUM SERPL-SCNC: 4.2 MMOL/L (ref 3.5–5.3)
RBC # BLD AUTO: 4.7 MILLION/UL (ref 3.88–5.62)
SODIUM SERPL-SCNC: 141 MMOL/L (ref 136–145)
WBC # BLD AUTO: 4.89 THOUSAND/UL (ref 4.31–10.16)

## 2021-04-25 PROCEDURE — 85027 COMPLETE CBC AUTOMATED: CPT | Performed by: ORTHOPAEDIC SURGERY

## 2021-04-25 PROCEDURE — 71046 X-RAY EXAM CHEST 2 VIEWS: CPT

## 2021-04-25 PROCEDURE — 80048 BASIC METABOLIC PNL TOTAL CA: CPT | Performed by: ORTHOPAEDIC SURGERY

## 2021-04-25 PROCEDURE — 99238 HOSP IP/OBS DSCHRG MGMT 30/<: CPT | Performed by: PHYSICIAN ASSISTANT

## 2021-04-25 PROCEDURE — NC001 PR NO CHARGE: Performed by: SURGERY

## 2021-04-25 PROCEDURE — 71045 X-RAY EXAM CHEST 1 VIEW: CPT

## 2021-04-25 RX ORDER — GABAPENTIN 100 MG/1
100 CAPSULE ORAL 3 TIMES DAILY
Qty: 45 CAPSULE | Refills: 0 | Status: SHIPPED | OUTPATIENT
Start: 2021-04-25 | End: 2021-05-13

## 2021-04-25 RX ORDER — SENNOSIDES 8.6 MG
17.2 TABLET ORAL DAILY
Refills: 0
Start: 2021-04-26 | End: 2021-09-28 | Stop reason: ALTCHOICE

## 2021-04-25 RX ORDER — METHOCARBAMOL 500 MG/1
500 TABLET, FILM COATED ORAL EVERY 6 HOURS SCHEDULED
Qty: 45 TABLET | Refills: 0 | Status: SHIPPED | OUTPATIENT
Start: 2021-04-25 | End: 2021-05-13

## 2021-04-25 RX ORDER — OXYCODONE HYDROCHLORIDE 5 MG/1
TABLET ORAL
Qty: 20 TABLET | Refills: 0 | Status: SHIPPED | OUTPATIENT
Start: 2021-04-25 | End: 2021-05-13

## 2021-04-25 RX ORDER — ACETAMINOPHEN 325 MG/1
975 TABLET ORAL EVERY 8 HOURS SCHEDULED
Refills: 0
Start: 2021-04-25 | End: 2021-09-28 | Stop reason: ALTCHOICE

## 2021-04-25 RX ORDER — DOCUSATE SODIUM 100 MG/1
100 CAPSULE, LIQUID FILLED ORAL 2 TIMES DAILY
Qty: 10 CAPSULE | Refills: 0 | Status: SHIPPED | OUTPATIENT
Start: 2021-04-25 | End: 2021-09-28

## 2021-04-25 RX ADMIN — GABAPENTIN 100 MG: 100 CAPSULE ORAL at 08:31

## 2021-04-25 RX ADMIN — OXYCODONE HYDROCHLORIDE 5 MG: 5 TABLET ORAL at 10:11

## 2021-04-25 RX ADMIN — ACETAMINOPHEN 975 MG: 325 TABLET ORAL at 13:29

## 2021-04-25 RX ADMIN — METHOCARBAMOL 500 MG: 500 TABLET, FILM COATED ORAL at 05:22

## 2021-04-25 RX ADMIN — OXYCODONE HYDROCHLORIDE 5 MG: 5 TABLET ORAL at 05:23

## 2021-04-25 RX ADMIN — METHOCARBAMOL 500 MG: 500 TABLET, FILM COATED ORAL at 13:28

## 2021-04-25 RX ADMIN — ACETAMINOPHEN 975 MG: 325 TABLET ORAL at 05:22

## 2021-04-25 RX ADMIN — LIDOCAINE 1 PATCH: 50 PATCH TOPICAL at 08:30

## 2021-04-25 RX ADMIN — ENOXAPARIN SODIUM 30 MG: 30 INJECTION SUBCUTANEOUS at 08:31

## 2021-04-25 NOTE — QUICK NOTE
Removed right sided chest tube after reviewing CXR from this morning which shows no residual pneumothorax  There was no air leak present with chest tube to water seal   Patient tolerated chest tube removal without difficulty  Follow-up chest x-ray ordered for 2:30 today  Anticipate discharge later today if follow-up chest x-ray shows no reaccumulation of pneumothorax

## 2021-04-25 NOTE — ASSESSMENT & PLAN NOTE
- Multiple right-sided rib fractures (2-5), present on admission   - Continue rib fracture protocol   - Continue to encourage incentive spirometer use and adequate pulmonary hygiene  Currently pulling 1250 mL on I S   - Continue multimodal analgesic regimen  Appreciate APS evaluation and recommendations   - Supplemental oxygen via nasal cannula as needed to maintain saturations greater than or equal to 94%  Currently on 2 L of oxygen at 98%  - Repeat chest x-ray from 4/23 reviewed: persistent right PTX  - PT and OT evaluation and treatment as indicated  Recommending DC home  - Repeat chest x-ray 4/25 pending   - Outpatient follow-up in the trauma clinic for re-evaluation in approximately 2 weeks

## 2021-04-25 NOTE — ASSESSMENT & PLAN NOTE
4/22 CT CAP: Multiple right nondisplaced 2nd through 5th lateral rib fractures with moderate-sized pneumothorax  No mediastinal shift  Small right pleural effusion with moderate partial atelectasis of the right middle and lower lobes  · Status post fall 4/20   · Currently saturating 98% on room air  · Aggressive pulmonary hygiene  · Right chest tube placed 4/23  Chest tube placed to water seal on 4/24 and CXR in AM on 4/25 showed resolution of PTX  Chest tube removed on 4/25 and post pull CXR shows no residual PTX     · D/C home today and f/u in trauma in clinic in 2 weeks

## 2021-04-25 NOTE — ASSESSMENT & PLAN NOTE
- Multiple right-sided rib fractures (2-5), present on admission   - Continue rib fracture protocol   - Continue to encourage incentive spirometer use and adequate pulmonary hygiene  Currently pulling 1250 mL on I S   - Continue multimodal analgesic regimen  Appreciate APS evaluation and recommendations  - Saturating in high 90s on room air    - R chest tube for R PTX removed on 4/25  F/u CXR shows no residual PTX    - PT and OT evaluation and treatment as indicated  Recommending DC home  - Outpatient follow-up in the trauma clinic for re-evaluation in approximately 2 weeks

## 2021-04-25 NOTE — DISCHARGE INSTRUCTIONS
Rib Fracture Discharge Instructions  Seek medical attention if you develop worsening chest pain or shortness of breath, dizziness/lightheadness, fevers/chills or sweats  No heavy lifting, pushing or pulling >10 pounds and no strenuous physical activity until cleared by trauma  No work or driving while taking narcotic pain medications and until cleared by trauma  Use your incentive spirometer at least hourly while awake  No flying or scuba diving for 6 weeks from time of lung injury  Keep right-sided chest wall dressing in place for 48 hours prior to removal   Keep clean and dry until that time  Can then replace the dressing with clean dry gauze, changing it daily and as needed until completely healed  Remaining suture will be removed at trauma follow-up appointment

## 2021-04-25 NOTE — DISCHARGE SUMMARY
[address] [facility]  Discharge- Yamilet Culver 1955, 76 y o  male MRN: [id number]  Unit/Bed#: [facility] 633-01 Encounter: [id number]  Primary Care Provider: Maria Luisa Velasquez DO   Date and time admitted to hospital: 4/22/2021  8:02 PM    [address]  · Fall from ladder  · Injuries as listed   · PT/OT recommending discharge home  · D/C home today    * Pneumothorax on right  Assessment & Plan  4/22 CT CAP: Multiple right nondisplaced 2nd through 5th lateral rib fractures with moderate-sized pneumothorax  No mediastinal shift  Small right pleural effusion with moderate partial atelectasis of the right middle and lower lobes  · Status post fall 4/20   · Currently saturating 98% on room air  · Aggressive pulmonary hygiene  · Right chest tube placed 4/23  Chest tube placed to water seal on 4/24 and CXR in AM on 4/25 showed resolution of PTX  Chest tube removed on 4/25 and post pull CXR shows no residual PTX  · D/C home today and f/u in trauma in clinic in 2 weeks    Closed fracture of multiple ribs of right side  Assessment & Plan  - Multiple right-sided rib fractures (2-5), present on admission   - Continue rib fracture protocol   - Continue to encourage incentive spirometer use and adequate pulmonary hygiene  Currently pulling 1250 mL on I S   - Continue multimodal analgesic regimen  Appreciate APS evaluation and recommendations  - Saturating in high 90s on room air    - R chest tube for R PTX removed on 4/25  F/u CXR shows no residual PTX    - PT and OT evaluation and treatment as indicated  Recommending DC home  - Outpatient follow-up in the trauma clinic for re-evaluation in approximately 2 weeks                  Resolved Problems  Date Reviewed: 4/23/2021    None          Admission Date:   Admission Orders (From admission, onward)     Ordered        04/22/21 1910  Inpatient Admission  Once                     Admitting Diagnosis: Fall, Pneumothorax    HPI: As documented by Dr Foreign Condon who evaluated the patient on admission, "Bonilla Sorensen is a 76 y o  male who presents as a transfer from [facility] for right 2nd-5th rib fractures and associated pneumothorax status post fall 2 days ago  Patient states that he was on a ladder when fell 4 feet onto the ground hitting his right side  He had immediate right sided chest wall pain and associated shortness of breath after falling  Denies any chest pain or dizziness/lightheadedness prior to falling His shortness of breath resolved the same night he fell  He has continued to have right chest wall pain that is pleuritic and progressing in severity  Denies head strike or LOC  No AC  Takes 81mg ASA daily for health maintenance per PCP recommendations  Denies headache, changes in vision, substernal chest pain, SOB, abdominal pain, N/V, or back pain  Past medical  Cardiac workup done at [facility] ED prior to transfer was unremarkable (EKG showed normal sinus rhythm, normal axis, normal intervals and QRS and no ST changes; troponin negative)  "    Procedures Performed:   Orders Placed This Encounter   Procedures    Chest Tube Insertion       Summary of Hospital Course: Patient was placed on the trauma service due to chest wall injury and PTX as stated above  His PTX on repeat CXR showed no signs of resolution so chest tube was placed on the right side on 4/23  Chest tube placed to water seal on 4/24 and CXR in AM on 4/25 showed resolution of PTX  Chest tube removed on 4/25 and post pull CXR shows no residual PTX  He did well from a respiratory standopint, saturating in the high 90s on room air  He had adequate pain control  He was seen by the acute pain service and placed on a multi modal analgesic regimen  He was up and ambulatory with PT/OT who cleared him for d/c home  He will f/u with trauma in 2 weeks  No work (as a ) until cleared by trauma         Significant Findings, Care, Treatment and Services Provided:   Xr Chest Portable    Result Date: 4/25/2021  Impression: Improving right basilar atelectasis  No evidence for residual or recurrent pneumothorax  Workstation performed: [id number]     Xr Chest Portable    Result Date: 4/24/2021  Impression: No pneumothorax appreciated  Workstation performed: [id number]     Xr Chest Portable    Result Date: 4/23/2021  Impression: Diminished residual right-sided pneumothorax status post chest tube placement Workstation performed: [id number]     Xr Chest 1 View Portable    Result Date: 4/23/2021  Impression: Persistent moderate right pneumothorax  Workstation performed: [id number]     Xr Chest Portable    Result Date: 4/23/2021  Impression: Persistent moderate right pneumothorax    Workstation performed: [id number]     Xr Chest Portable    Result Date: 4/23/2021  Impression: Persistent moderate right pneumothorax  Workstation performed: [id number]     Xr Chest Pa & Lateral    Result Date: 4/25/2021  Impression: Stable atelectasis at the right base  A right pneumothorax is not apparent on this examination  Workstation performed: [id number]     Ct Head Without Contrast    Result Date: 4/22/2021  Impression: No acute intracranial abnormality  Workstation performed: [id number]     Ct Chest Abdomen Pelvis W Contrast    Result Date: 4/22/2021  Impression: Multiple right nondisplaced 2nd through 5th lateral rib fractures with moderate-sized pneumothorax  No mediastinal shift  Small right pleural effusion with moderate partial atelectasis of the right middle and lower lobes  Punctate focus of gas above the right diaphragm near the cardiophrenic sulcus  No pneumoperitoneum  I personally discussed this study with Heidi Doll on 4/22/2021 at 6:02 PM  Workstation performed: [id number]       Complications: none    Condition at Discharge: good         Discharge instructions/Information to patient and family:   See after visit summary for information provided to patient and family        Provisions for Follow-Up Care:  See after visit summary for information related to follow-up care and any pertinent home health orders  PCP: Sera Rueda DO    Disposition: Home    Planned Readmission: No    Discharge Statement   I spent 25 minutes discharging the patient  This time was spent on the day of discharge  I had direct contact with the patient on the day of discharge  Additional documentation is required if more than 30 minutes were spent on discharge  Discharge Medications:  See after visit summary for reconciled discharge medications provided to patient and family

## 2021-04-25 NOTE — PROGRESS NOTES
[address] [facility]  Progress Note Vianey Still 1955, 76 y o  male MRN: [id number]  Unit/Bed#: [facility] 633-01 Encounter: [id number]  Primary Care Provider: Artie West DO   Date and time admitted to hospital: 4/22/2021  8:02 PM    * Pneumothorax on right  Assessment & Plan  4/22 CT CAP: Multiple right nondisplaced 2nd through 5th lateral rib fractures with moderate-sized pneumothorax  No mediastinal shift  Small right pleural effusion with moderate partial atelectasis of the right middle and lower lobes  · Status post fall 4/20   · Hemodynamically stable, spo2 >94% on RA   · Currently on 2 L O2 with 98%  · Aggressive pulmonary hygiene  · Respiratory protocol   · Continuous pulse ox  · Repeat CXR in AM 4/23: persistent pneumothorax  · Right chest tube placed 4/23   · Repeat CXR in AM 4/24: no pneumothorax identified   · Chest tube placed to water seal, repeat CXR this AM    Fall  Assessment & Plan  · Fall from ladder  · Injuries as listed   · PT/OT recommending discharge home    Closed fracture of multiple ribs of right side  Assessment & Plan  - Multiple right-sided rib fractures (2-5), present on admission   - Continue rib fracture protocol   - Continue to encourage incentive spirometer use and adequate pulmonary hygiene  Currently pulling 1250 mL on I S   - Continue multimodal analgesic regimen  Appreciate APS evaluation and recommendations   - Supplemental oxygen via nasal cannula as needed to maintain saturations greater than or equal to 94%  Currently on 2 L of oxygen at 98%  - Repeat chest x-ray from 4/23 reviewed: persistent right PTX  - PT and OT evaluation and treatment as indicated  Recommending DC home  - Repeat chest x-ray 4/25 pending   - Outpatient follow-up in the trauma clinic for re-evaluation in approximately 2 weeks  Disposition:   [facility] level care, possible removal of right chest tube pending repeat chest x-ray, DC pending medical stability      SUBJECTIVE:  Chief Complaint: "Burning sensation" around right chest tube    Subjective:  Reports a burning sensation around chest tube site that was persistent when lying flat but resolved when sitting up  Otherwise no new complaints  No acute events overnight  Denies shortness of breath or chest pain  Currently on 2 L nasal cannula and resting comfortably  OBJECTIVE:     Meds/Allergies     Current Facility-Administered Medications:     acetaminophen (TYLENOL) tablet 975 mg, 975 mg, Oral, Q8H [facility], Opal Abad MD, 975 mg at 04/25/21 0522    docusate sodium (COLACE) capsule 100 mg, 100 mg, Oral, BID, Opal Abad MD, 100 mg at 04/24/21 0827    enoxaparin (LOVENOX) subcutaneous injection 30 mg, 30 mg, Subcutaneous, Q12H [facility], Opal Abad MD, 30 mg at 04/24/21 2202    gabapentin (NEURONTIN) capsule 100 mg, 100 mg, Oral, TID, Tobi Delgadillo PA-C, 100 mg at 04/24/21 2202    HYDROmorphone HCl (DILAUDID) injection 0 2 mg, 0 2 mg, Intravenous, Q3H PRN, Opal Abad MD    lidocaine (LIDODERM) 5 % patch 1 patch, 1 patch, Topical, Daily, Opal Abad MD, 1 patch at 04/24/21 0827    methocarbamol (ROBAXIN) tablet 500 mg, 500 mg, Oral, Q6H [facility], Opal Abad MD, 500 mg at 04/25/21 0522    ondansetron (ZOFRAN) injection 4 mg, 4 mg, Intravenous, Q6H PRN, Opal Abad MD    oxyCODONE (ROXICODONE) IR tablet 2 5 mg, 2 5 mg, Oral, Q4H PRN, Opal Abad MD    oxyCODONE (ROXICODONE) IR tablet 5 mg, 5 mg, Oral, Q4H PRN, Opal Abad MD, 5 mg at 04/25/21 0523    senna (SENOKOT) tablet 17 2 mg, 2 tablet, Oral, Daily, Opal Abad MD, 17 2 mg at 04/24/21 0827     Vitals:   Vitals:    04/25/21 0709   BP: 120/74   Pulse: (!) 53   Resp: 16   Temp: 97 8 °F (36 6 °C)   SpO2: 97%       Intake/Output:  I/O       04/23 0701 - 04/24 0700 04/24 0701 - 04/25 0700 04/25 0701 - 04/26 0700    P  O  840 1340     Total Intake(mL/kg) 840 (11 8) 1340 (18 8)     Urine (mL/kg/hr) 825 (0 5) 0 (0) Chest Tube 52 10     Total Output 877 10     Net -37 +1330            Unmeasured Urine Occurrence 1 x 6 x            Physical Exam:   Physical Exam  Constitutional:       General: He is not in acute distress  Appearance: Normal appearance  He is not ill-appearing  HENT:      Head: Normocephalic and atraumatic  Right Ear: External ear normal       Left Ear: External ear normal       Nose: Nose normal       Mouth/Throat:      Mouth: Mucous membranes are moist       Pharynx: Oropharynx is clear  Eyes:      Pupils: Pupils are equal, round, and reactive to light  Cardiovascular:      Rate and Rhythm: Normal rate and regular rhythm  Pulses: Normal pulses  Heart sounds: Normal heart sounds  Pulmonary:      Effort: Pulmonary effort is normal       Breath sounds: Normal breath sounds  Comments: Right chest tube in place and to water seal, serosanguineous output   Chest:      Chest wall: Tenderness present  Abdominal:      General: Abdomen is flat  There is no distension  Palpations: Abdomen is soft  Tenderness: There is no abdominal tenderness  Musculoskeletal: Normal range of motion  Skin:     General: Skin is warm  Capillary Refill: Capillary refill takes less than 2 seconds  Neurological:      General: No focal deficit present  Mental Status: He is alert and oriented to person, place, and time  Invasive Devices     Peripheral Intravenous Line            Peripheral IV 04/22/21 Right Antecubital 2 days          Drain            Chest Tube Right 1 day                 Lab Results: Results: I have personally reviewed pertinent reports  Imaging/EKG Studies: Results: I have personally reviewed pertinent reports      Other Studies: none   VTE Prophylaxis: Sequential compression device (Venodyne)  and Enoxaparin (Lovenox)

## 2021-04-25 NOTE — ASSESSMENT & PLAN NOTE
4/22 CT CAP: Multiple right nondisplaced 2nd through 5th lateral rib fractures with moderate-sized pneumothorax  No mediastinal shift  Small right pleural effusion with moderate partial atelectasis of the right middle and lower lobes    · Status post fall 4/20   · Hemodynamically stable, spo2 >94% on RA   · Currently on 2 L O2 with 98%  · Aggressive pulmonary hygiene  · Respiratory protocol   · Continuous pulse ox  · Repeat CXR in AM 4/23: persistent pneumothorax  · Right chest tube placed 4/23   · Repeat CXR in AM 4/24: no pneumothorax identified   · Chest tube placed to water seal, repeat CXR this AM

## 2021-04-26 ENCOUNTER — TRANSITIONAL CARE MANAGEMENT (OUTPATIENT)
Dept: FAMILY MEDICINE CLINIC | Facility: CLINIC | Age: 66
End: 2021-04-26

## 2021-04-28 ENCOUNTER — IMMUNIZATIONS (OUTPATIENT)
Dept: FAMILY MEDICINE CLINIC | Facility: HOSPITAL | Age: 66
End: 2021-04-28

## 2021-04-28 DIAGNOSIS — Z23 ENCOUNTER FOR IMMUNIZATION: Primary | ICD-10-CM

## 2021-04-28 PROCEDURE — 91301 SARS-COV-2 / COVID-19 MRNA VACCINE (MODERNA) 100 MCG: CPT

## 2021-04-28 PROCEDURE — 0012A SARS-COV-2 / COVID-19 MRNA VACCINE (MODERNA) 100 MCG: CPT

## 2021-05-12 ENCOUNTER — CONSULT (OUTPATIENT)
Dept: GASTROENTEROLOGY | Facility: CLINIC | Age: 66
End: 2021-05-12
Payer: MEDICARE

## 2021-05-12 VITALS
WEIGHT: 159 LBS | TEMPERATURE: 98.2 F | BODY MASS INDEX: 24.96 KG/M2 | HEIGHT: 67 IN | DIASTOLIC BLOOD PRESSURE: 84 MMHG | SYSTOLIC BLOOD PRESSURE: 148 MMHG | HEART RATE: 76 BPM

## 2021-05-12 DIAGNOSIS — R13.19 ESOPHAGEAL DYSPHAGIA: ICD-10-CM

## 2021-05-12 DIAGNOSIS — R11.11 VOMITING WITHOUT NAUSEA, INTRACTABILITY OF VOMITING NOT SPECIFIED, UNSPECIFIED VOMITING TYPE: ICD-10-CM

## 2021-05-12 DIAGNOSIS — Z12.11 SCREENING FOR COLORECTAL CANCER: Primary | ICD-10-CM

## 2021-05-12 DIAGNOSIS — Z12.12 SCREENING FOR COLORECTAL CANCER: Primary | ICD-10-CM

## 2021-05-12 PROCEDURE — 99204 OFFICE O/P NEW MOD 45 MIN: CPT | Performed by: INTERNAL MEDICINE

## 2021-05-12 RX ORDER — SODIUM, POTASSIUM,MAG SULFATES 17.5-3.13G
180 SOLUTION, RECONSTITUTED, ORAL ORAL ONCE
Qty: 180 ML | Refills: 0 | Status: SHIPPED | OUTPATIENT
Start: 2021-05-12 | End: 2021-10-27

## 2021-05-12 NOTE — PATIENT INSTRUCTIONS
Pt lulu for 7/16 with Dr Cooper Strauss instructions explained and given   F/u appt made on 10/5/2021

## 2021-05-12 NOTE — PROGRESS NOTES
[facility] - Outpatient Consultation  Axel Senior 76 y o  male MRN: [id number]  Encounter: [id number]          ASSESSMENT AND PLAN:      1  Screening for colorectal cancer  I will schedule him for screening colonoscopy since his last one was 30 years ago  My medical assistant will review the instructions for the bowel preparation and find a date that is convenient for him  - Ambulatory referral to Gastroenterology  - Colonoscopy; Future  - Suprep Bowel Prep Kit 17 5-3 13-1 6 GM/177ML SOLN; Take 180 mL by mouth once for 1 dose  Dispense: 180 mL; Refill: 0    2  Vomiting without nausea, intractability of vomiting not specified, unspecified vomiting type  3  Esophageal dysphagia  His vomiting and difficulty swallowing could be due to reflux esophagitis, peptic stricture, peptic ulcer disease, or gastroparesis  I will schedule him for an upper endoscopy at the same time as his colonoscopy to evaluate the cause  - EGD; Future    ______________________________________________________________________    HPI:  He presents for evaluation because of multiple gastrointestinal complaints  When he was younger he had severe reflux symptoms but that has not been an issue in the past 30 years  Recently he has been having issues with food getting stuck when he tries to swallow and intermittent episodes of vomiting  He denies any diarrhea, constipation, bleeding, and weight loss  He believes his last upper endoscopy and colonoscopy were approximately 30 years ago  Unfortunately he has had a number of injuries over the years and has some pain in his joints and muscles as a result of these injuries  He also had a right pneumothorax because of her recent fall from a ladder  REVIEW OF SYSTEMS:    CONSTITUTIONAL: Denies any fever, chills, rigors, and weight loss  HEENT: No earache or tinnitus  Denies hearing loss or visual disturbances  CARDIOVASCULAR: No chest pain or palpitations  RESPIRATORY: Denies any cough, hemoptysis, shortness of breath or dyspnea on exertion  GASTROINTESTINAL: As noted in the History of Present Illness  GENITOURINARY: No problems with urination  Denies any hematuria or dysuria  NEUROLOGIC: No dizziness or vertigo, denies headaches  MUSCULOSKELETAL: Has muscle and joint pain  SKIN: Denies skin rashes or itching  ENDOCRINE: Denies excessive thirst  Denies intolerance to heat or cold  PSYCHOSOCIAL: Denies depression or anxiety  Denies any recent memory loss  Historical Information   No past medical history on file  Past Surgical History:   Procedure Laterality Date    APPENDECTOMY      GUM SURGERY       Social History   Social History     Substance and Sexual Activity   Alcohol Use Yes    Frequency: Monthly or less    Drinks per session: 1 or 2    Binge frequency: Never     Social History     Substance and Sexual Activity   Drug Use Never     Social History     Tobacco Use   Smoking Status Never Smoker   Smokeless Tobacco Never Used     No family history on file  Meds/Allergies       Current Outpatient Medications:     acetaminophen (TYLENOL) 325 mg tablet    Aspirin Buf,CaCarb-MgCarb-MgO, 81 MG TABS    B Complex Vitamins (B COMPLEX 50 PO)    cholecalciferol (VITAMIN D3) 1,000 units tablet    desloratadine (CLARINEX) 5 MG tablet    docusate sodium (COLACE) 100 mg capsule    gabapentin (NEURONTIN) 100 mg capsule    methocarbamol (ROBAXIN) 500 mg tablet    Multiple Vitamin (multivitamin) capsule    oxyCODONE (ROXICODONE) 5 mg immediate release tablet    pseudoephedrine (SUDAFED) 60 mg tablet    senna (SENOKOT) 8 6 mg    Suprep Bowel Prep Kit 17 5-3 13-1 6 GM/177ML SOLN    Allergies   Allergen Reactions    Chocolate - Food Allergy Other (See Comments)     Cold sweats    Ranitidine Edema           Objective     Blood pressure 148/84, pulse 76, temperature 98 2 °F (36 8 °C), height 5' 7" (1 702 m), weight 72 1 kg (159 lb)   Body mass index is 24 9 kg/m²  PHYSICAL EXAM:      General Appearance:   Alert, cooperative, no distress   HEENT:   Normocephalic, atraumatic, anicteric      Neck:  Supple, symmetrical, trachea midline   Lungs:   Clear to auscultation bilaterally; no rales, rhonchi or wheezing; respirations unlabored    Heart[de-identified]   Regular rate and rhythm; no murmur, rub, or gallop  Abdomen:   Soft, non-tender, non-distended; normal bowel sounds; no masses, no organomegaly    Genitalia:   Deferred    Rectal:   Deferred    Extremities:  No cyanosis, clubbing or edema    Pulses:  2+ and symmetric    Skin:  No jaundice, rashes, or lesions    Lymph nodes:  No palpable cervical lymphadenopathy        Lab Results:   No visits with results within 1 Day(s) from this visit     Latest known visit with results is:   Admission on 04/22/2021, Discharged on 04/25/2021   Component Date Value    WBC 04/23/2021 5 06     RBC 04/23/2021 4 35     Hemoglobin 04/23/2021 13 2     Hematocrit 04/23/2021 38 8     MCV 04/23/2021 89     MCH 04/23/2021 30 3     MCHC 04/23/2021 34 0     RDW 04/23/2021 12 5     Platelets 05/05/8027 177     MPV 04/23/2021 10 1     Sodium 04/23/2021 142     Potassium 04/23/2021 3 6     Chloride 04/23/2021 110*    CO2 04/23/2021 27     ANION GAP 04/23/2021 5     BUN 04/23/2021 18     Creatinine 04/23/2021 0 59*    Glucose 04/23/2021 95     Calcium 04/23/2021 8 4     eGFR 04/23/2021 105     Magnesium 04/23/2021 2 2     Phosphorus 04/23/2021 3 7     WBC 04/24/2021 5 78     RBC 04/24/2021 4 74     Hemoglobin 04/24/2021 14 0     Hematocrit 04/24/2021 41 8     MCV 04/24/2021 88     MCH 04/24/2021 29 5     MCHC 04/24/2021 33 5     RDW 04/24/2021 12 7     MPV 04/24/2021 10 0     Platelets 12/95/5729 198     nRBC 04/24/2021 0     Neutrophils Relative 04/24/2021 54     Immat GRANS % 04/24/2021 0     Lymphocytes Relative 04/24/2021 28     Monocytes Relative 04/24/2021 11     Eosinophils Relative 04/24/2021 6  Basophils Relative 04/24/2021 1     Neutrophils Absolute 04/24/2021 3 16     Immature Grans Absolute 04/24/2021 0 02     Lymphocytes Absolute 04/24/2021 1 60     Monocytes Absolute 04/24/2021 0 61     Eosinophils Absolute 04/24/2021 0 34     Basophils Absolute 04/24/2021 0 05     Sodium 04/24/2021 140     Potassium 04/24/2021 3 7     Chloride 04/24/2021 110*    CO2 04/24/2021 27     ANION GAP 04/24/2021 3*    BUN 04/24/2021 22     Creatinine 04/24/2021 0 64     Glucose 04/24/2021 100     Calcium 04/24/2021 8 6     eGFR 04/24/2021 102     WBC 04/25/2021 4 89     RBC 04/25/2021 4 70     Hemoglobin 04/25/2021 14 2     Hematocrit 04/25/2021 42 3     MCV 04/25/2021 90     MCH 04/25/2021 30 2     MCHC 04/25/2021 33 6     RDW 04/25/2021 12 5     Platelets 29/38/9733 206     MPV 04/25/2021 10 0     Sodium 04/25/2021 141     Potassium 04/25/2021 4 2     Chloride 04/25/2021 109*    CO2 04/25/2021 31     ANION GAP 04/25/2021 1*    BUN 04/25/2021 27*    Creatinine 04/25/2021 0 70     Glucose 04/25/2021 106     Calcium 04/25/2021 8 6     eGFR 04/25/2021 98          Radiology Results:   Xr Chest Portable    Result Date: 4/25/2021  Narrative: CHEST INDICATION:   f/u pneumothorax with chest tube off suction  COMPARISON:  4/24/2021 EXAM PERFORMED/VIEWS:  XR CHEST PORTABLE  AP semierect FINDINGS:  There is a barely perceptible small caliber chest tube at the right base  Cardiomediastinal silhouette appears stable and unremarkable  Minimal right base atelectasis improving  No pneumothorax apparent  Left lung clear  No acute osseous abnormalities  Calcific tendinitis of the right shoulder  Impression: Improving right basilar atelectasis  No evidence for residual or recurrent pneumothorax  Workstation performed: [id number]     Xr Chest Portable    Result Date: 4/24/2021  Narrative: CHEST INDICATION:   F/u chest tube with poss air leak   COMPARISON:  Compared with 4/23/2021 EXAM PERFORMED/VIEWS:  XR CHEST PORTABLE FINDINGS:  Right lung base chest tube  Cardiomediastinal silhouette appears unremarkable  Patchy atelectasis in the right lung base  No pneumothorax or pleural effusion  Osseous structures appear within normal limits for patient age  Impression: No pneumothorax appreciated  Workstation performed: [id number]     Xr Chest Portable    Result Date: 4/23/2021  Narrative: CHEST INDICATION:   s/p R Chest tube  COMPARISON:  4/23/2021 EXAM PERFORMED/VIEWS:  XR CHEST PORTABLE Single view FINDINGS: Faintly visualized small caliber chest tube has been inserted at the right lung base  Diminished small to moderate right pneumothorax  Cardiomediastinal silhouette appears unremarkable  Probable persistent right basal atelectasis  No pleural effusion  Osseous structures appear within normal limits for patient age  Impression: Diminished residual right-sided pneumothorax status post chest tube placement Workstation performed: [id number]     Xr Chest 1 View Portable    Result Date: 4/23/2021  Narrative: CHEST INDICATION:   Trauma  Pneumothorax  COMPARISON:  CT performed earlier the same day  EXAM PERFORMED/VIEWS:  XR CHEST PORTABLE FINDINGS: Cardiomediastinal silhouette appears unremarkable  Moderate right pneumothorax again identified  No mediastinal shift  No pleural effusions  Right basilar atelectasis  Right rib fractures seen on earlier CT scan are not well visualized on this study  Impression: Persistent moderate right pneumothorax  Workstation performed: [id number]     Xr Chest Portable    Result Date: 4/23/2021  Narrative: CHEST INDICATION:   f/u right sided pneumothorax  COMPARISON:  Chest x-ray performed the previous day  EXAM PERFORMED/VIEWS:  XR CHEST PORTABLE FINDINGS: Cardiomediastinal silhouette appears unremarkable  Moderate right pneumothorax again identified  No mediastinal shift  No pleural effusions  Right basilar atelectasis   Right rib fractures seen on earlier CT scan are not well visualized on this study  Impression: Persistent moderate right pneumothorax    Workstation performed: [id number]     Xr Chest Portable    Result Date: 4/23/2021  Narrative: CHEST INDICATION:   f/u right sided pneumothorax  COMPARISON:  Study performed earlier the same day  EXAM PERFORMED/VIEWS:  XR CHEST PORTABLE FINDINGS: Cardiomediastinal silhouette appears unremarkable  Moderate right pneumothorax unchanged  No mediastinal shift  No pleural effusions  Right basilar atelectasis  Right rib fractures seen on earlier CT scan are not well visualized on this study  Impression: Persistent moderate right pneumothorax  Workstation performed: [id number]     Xr Chest Pa & Lateral    Result Date: 4/25/2021  Narrative: CHEST INDICATION:   evaluate for recurrent PTX following chest tube removal  COMPARISON:  Compared with 4/25/2021 EXAM PERFORMED/VIEWS:  XR CHEST PA & LATERAL FINDINGS: Cardiomediastinal silhouette appears unremarkable  Right lung base atelectasis  [place] No pneumothorax or pleural effusion  [place] area over the right upper abdomen is unremarkable on the lateral view and may be technical  Osseous structures appear within normal limits for patient age  Impression: No pneumothorax appreciated  Workstation performed: [id number]     Xr Chest Pa & Lateral    Result Date: 4/25/2021  Narrative: CHEST INDICATION:   evaluate lung for PTX off suction  COMPARISON:  Prior chest x-ray performed earlier the same day  EXAM PERFORMED/VIEWS:  XR CHEST PA & LATERAL  The frontal view was performed utilizing dual energy radiographic technique  Images: 4 FINDINGS:  Barely visible small caliber chest tube in the right lung base appearing stable  Cardiomediastinal silhouette appears unremarkable  Persistent minimal atelectatic change at the right base  Lungs otherwise clear  No pneumothorax apparent  No acute osseous abnormalities apparent  Impression: Stable atelectasis at the right base  A right pneumothorax is not apparent on this examination  Workstation performed: [id number]     Ct Head Without Contrast    Result Date: 4/22/2021  Narrative: CT BRAIN - WITHOUT CONTRAST INDICATION:   fall with head strike  COMPARISON:  None  TECHNIQUE:  CT examination of the brain was performed  In addition to axial images, sagittal and coronal 2D reformatted images were created and submitted for interpretation  Radiation dose length product (DLP) for this visit:  816 33 mGy-cm   This examination, like all CT scans performed in the [facility], was performed utilizing techniques to minimize radiation dose exposure, including the use of iterative  reconstruction and automated exposure control  IMAGE QUALITY:  Diagnostic  FINDINGS: PARENCHYMA:  No intracranial mass, mass effect or midline shift  No CT signs of acute infarction  No acute parenchymal hemorrhage  VENTRICLES AND EXTRA-AXIAL SPACES:  Normal for the patient's age  VISUALIZED ORBITS AND PARANASAL SINUSES:  Unremarkable  CALVARIUM AND EXTRACRANIAL SOFT TISSUES:  No calvarial fracture  Focal calcification in the left temporal scalp, likely dystrophic  Impression: No acute intracranial abnormality  Workstation performed: [id number]     Ct Chest Abdomen Pelvis W Contrast    Result Date: 4/22/2021  Narrative: CT CHEST, ABDOMEN AND PELVIS WITH IV CONTRAST INDICATION:   fall off ladder now with pain to R chest,flank, abd  COMPARISON:  None  TECHNIQUE: CT examination of the chest, abdomen and pelvis was performed  Axial, sagittal, and coronal 2D reformatted images were created from the source data and submitted for interpretation  Radiation dose length product (DLP) for this visit:  623 02 mGy-cm   This examination, like all CT scans performed in the [facility], was performed utilizing techniques to minimize radiation dose exposure, including the use of iterative  reconstruction and automated exposure control   IV Contrast: 100 mL of iohexol (OMNIPAQUE) Enteric Contrast: Enteric contrast was administered  FINDINGS: CHEST LUNGS and PLEURA:  Moderate-sized right pneumothorax  No mediastinal shift  Partial atelectasis of the right middle and lower lobes  Mild atelectasis in the dependent portion of the right upper lobe  Small right pleural effusion  HEART/GREAT VESSELS:  Coronary vascular calcifications  Intraventricular septum remains midline  Right ventricular wall contour is unremarkable  MEDIASTINUM AND ALF:  Unremarkable  CHEST WALL AND LOWER NECK:   There are multiple nondisplaced right-sided rib fractures from T2 to T5  No chest wall hematoma  Differences in the size of the right chest wall musculature is likely due to the difference in arm position  There are 2 small foci of gas in the right cardio phrenic sulcus on image 81 of series 3  This is anterior to the right hepatic lobe and above the diaphragm  ABDOMEN LIVER/BILIARY TREE:  Unremarkable  GALLBLADDER:  No calcified gallstones  No pericholecystic inflammatory change  SPLEEN:  Unremarkable  PANCREAS:  Unremarkable  ADRENAL GLANDS:  Unremarkable  KIDNEYS/URETERS:  Unremarkable  No hydronephrosis  STOMACH AND BOWEL:  Large amount of stool burden within the right colon  Mild air-filled distention of the transverse colon and normal caliber to the left colon  Small bowel is unremarkable  APPENDIX:  No findings to suggest appendicitis  ABDOMINOPELVIC CAVITY:  No ascites  No pneumoperitoneum  No lymphadenopathy  VESSELS:  Atherosclerotic changes are present  No evidence of aneurysm  PELVIS REPRODUCTIVE ORGANS:  Unremarkable for patient's age  URINARY BLADDER:  Unremarkable  ABDOMINAL WALL/INGUINAL REGIONS:  Unremarkable  OSSEOUS STRUCTURES:  No acute fracture or destructive osseous lesion  Gradual kyphosis of the thoracic spine centered predominantly at the lower thoracic and upper lumbar levels with degenerative changes and mild retrolisthesis at L1-L2 and L2-L3  There is degenerative anterolisthesis of L5 on S1  Mild decreased stature to the L5 vertebral body is likely secondary to a chronic compression deformity  Impression: Multiple right nondisplaced 2nd through 5th lateral rib fractures with moderate-sized pneumothorax  No mediastinal shift  Small right pleural effusion with moderate partial atelectasis of the right middle and lower lobes  Punctate focus of gas above the right diaphragm near the cardiophrenic sulcus  No pneumoperitoneum    I personally discussed this study with Izabel Mata on 4/22/2021 at 6:02 PM  Workstation performed: [id number]

## 2021-05-13 ENCOUNTER — OFFICE VISIT (OUTPATIENT)
Dept: SURGERY | Facility: CLINIC | Age: 66
End: 2021-05-13
Payer: COMMERCIAL

## 2021-05-13 VITALS — BODY MASS INDEX: 24.45 KG/M2 | TEMPERATURE: 98.4 F | WEIGHT: 155.8 LBS | HEIGHT: 67 IN

## 2021-05-13 DIAGNOSIS — J93.9 PNEUMOTHORAX ON RIGHT: Primary | ICD-10-CM

## 2021-05-13 DIAGNOSIS — S22.41XA CLOSED FRACTURE OF MULTIPLE RIBS OF RIGHT SIDE: ICD-10-CM

## 2021-05-13 DIAGNOSIS — L08.9 SKIN INFECTION: ICD-10-CM

## 2021-05-13 PROCEDURE — 99212 OFFICE O/P EST SF 10 MIN: CPT | Performed by: SURGERY

## 2021-05-13 RX ORDER — METHOCARBAMOL 500 MG/1
500 TABLET, FILM COATED ORAL EVERY 6 HOURS SCHEDULED
Qty: 45 TABLET | Refills: 0 | Status: SHIPPED | OUTPATIENT
Start: 2021-05-13 | End: 2021-09-28 | Stop reason: ALTCHOICE

## 2021-05-13 RX ORDER — GABAPENTIN 100 MG/1
100 CAPSULE ORAL 3 TIMES DAILY
Qty: 45 CAPSULE | Refills: 0 | Status: SHIPPED | OUTPATIENT
Start: 2021-05-13 | End: 2021-09-28 | Stop reason: ALTCHOICE

## 2021-05-13 RX ORDER — CEPHALEXIN 500 MG/1
500 CAPSULE ORAL EVERY 6 HOURS SCHEDULED
Qty: 28 CAPSULE | Refills: 0 | Status: SHIPPED | OUTPATIENT
Start: 2021-05-13 | End: 2021-05-20

## 2021-05-13 RX ORDER — OXYCODONE HYDROCHLORIDE 5 MG/1
TABLET ORAL
Qty: 30 TABLET | Refills: 0 | Status: SHIPPED | OUTPATIENT
Start: 2021-05-13 | End: 2021-09-28 | Stop reason: ALTCHOICE

## 2021-05-13 NOTE — LETTER
May 13, 2021     Patient: Andrea Houston   YOB: 1955   Date of Visit: 5/13/2021       To Whom it May Concern:    Andrea Houston is under my professional care. He was seen in my office on 5/13/2021. He was seen in outpatient office today.  Small infection at chest tube site requiring antibiotics.  No lifting greater then 5-10 pounds for next six week.  Will see back in office for re-evaluation at that time.  No work at this time..    If you have any questions or concerns, please don't hesitate to call.         Sincerely,          RAEGAN TRAUMA PROVIDER        CC: No Recipients

## 2021-05-13 NOTE — ASSESSMENT & PLAN NOTE
Lungs CTA bilaterally  Good airflow through all lung bases  Suture in Right chest tube site.  Skin reddened and elevated, tender to touch.  Suture removed without difficulty but purulent discharge from shest tube site.  Will treat with 5 days of Keflex, patient denies any allergy.  Instructed to wash with soap and water, dry dressing daily.

## 2021-05-13 NOTE — PATIENT INSTRUCTIONS
Wash right chest tube site daily with soap and water  Bandaid if draining  No work for next six weeks secondary to lifting, and rib fractures with site infection  Medications reordered

## 2021-05-13 NOTE — PROGRESS NOTES
"Office Visit - General Surgery  Andrea Houston MRN: [id number]  Encounter: [id number]    Assessment and Plan    Problem List Items Addressed This Visit        Respiratory    Pneumothorax on right - Primary     Resolved on CXR from 4/25/21            Musculoskeletal and Integument    Closed fracture of multiple ribs of right side     Lungs CTA bilaterally  Good airflow through all lung bases  Suture in Right chest tube site.  Skin reddened and elevated, tender to touch.  Suture removed without difficulty but purulent discharge from shest tube site.  Will treat with 5 days of Keflex, patient denies any allergy.  Instructed to wash with soap and water, dry dressing daily.         Relevant Medications    oxyCODONE (ROXICODONE) 5 mg immediate release tablet    methocarbamol (ROBAXIN) 500 mg tablet    gabapentin (NEURONTIN) 100 mg capsule          Chief Complaint:  Andrea Houston is a 65 y.o. male who presents for Fall (f/u fall. rib fx, still having slight pain. Denies SOB.)    Subjective  \" I feel pretty good, sore over chest tube site\"    Past Medical History  History reviewed. No pertinent past medical history.    Past Surgical History  Past Surgical History:   Procedure Laterality Date   • APPENDECTOMY     • GUM SURGERY         Family History  Family History   Problem Relation Age of Onset   • No Known Problems Mother    • No Known Problems Father        Social History  Social History     Socioeconomic History   • Marital status: /Civil Union     Spouse name: None   • Number of children: None   • Years of education: None   • Highest education level: None   Occupational History   • None   Social Needs   • Financial resource strain: None   • Food insecurity     Worry: None     Inability: None   • Transportation needs     Medical: None     Non-medical: None   Tobacco Use   • Smoking status: Never Smoker   • Smokeless tobacco: Never Used   Substance and Sexual Activity   • Alcohol use: Yes     Frequency: Monthly " or less     Drinks per session: 1 or 2     Binge frequency: Never   • Drug use: Never   • Sexual activity: None   Lifestyle   • Physical activity     Days per week: None     Minutes per session: None   • Stress: None   Relationships   • Social connections     Talks on phone: None     Gets together: None     Attends Hindu service: None     Active member of club or organization: None     Attends meetings of clubs or organizations: None     Relationship status: None   • Intimate partner violence     Fear of current or ex partner: None     Emotionally abused: None     Physically abused: None     Forced sexual activity: None   Other Topics Concern   • None   Social History Narrative   • None        Medications  Current Outpatient Medications on File Prior to Visit   Medication Sig Dispense Refill   • acetaminophen (TYLENOL) 325 mg tablet Take 3 tablets (975 mg total) by mouth every 8 (eight) hours  0   • Aspirin Buf,CaCarb-MgCarb-MgO, 81 MG TABS Take 1 tablet by mouth daily     • B Complex Vitamins (B COMPLEX 50 PO) Take 1 capsule by mouth daily     • cholecalciferol (VITAMIN D3) 1,000 units tablet Take 2,000 Units by mouth daily     • desloratadine (CLARINEX) 5 MG tablet Take 1 tablet (5 mg total) by mouth daily 30 tablet 3   • docusate sodium (COLACE) 100 mg capsule Take 1 capsule (100 mg total) by mouth 2 (two) times a day 10 capsule 0   • Multiple Vitamin (multivitamin) capsule Take by mouth     • pseudoephedrine (SUDAFED) 60 mg tablet Take 60 mg by mouth every 4 (four) hours as needed for congestion     • senna (SENOKOT) 8.6 mg Take 2 tablets (17.2 mg total) by mouth daily  0   • Suprep Bowel Prep Kit 17.5-3.13-1.6 GM/177ML SOLN Take 180 mL by mouth once for 1 dose 180 mL 0   • [DISCONTINUED] gabapentin (NEURONTIN) 100 mg capsule Take 1 capsule (100 mg total) by mouth 3 (three) times a day (Patient not taking: Reported on 5/13/2021) 45 capsule 0   • [DISCONTINUED] methocarbamol (ROBAXIN) 500 mg tablet Take 1  tablet (500 mg total) by mouth every 6 (six) hours (Patient not taking: Reported on 5/13/2021) 45 tablet 0   • [DISCONTINUED] oxyCODONE (ROXICODONE) 5 mg immediate release tablet 2.5 mg to 5 mg p.o. Every 4 hours as needed for moderate to severe pain (Patient not taking: Reported on 5/13/2021) 20 tablet 0     No current facility-administered medications on file prior to visit.        Allergies  Allergies   Allergen Reactions   • Chocolate - Food Allergy Other (See Comments)     Cold sweats   • Ranitidine Edema       Review of Systems   Constitutional: Positive for activity change.   HENT: Negative.    Eyes: Negative.    Respiratory: Negative.    Cardiovascular: Negative.    Gastrointestinal: Negative.    Endocrine: Negative.    Genitourinary: Negative.    Musculoskeletal: Negative.    Skin: Positive for wound.        Right chest tube site suture   Allergic/Immunologic: Negative.    Neurological: Negative.    Hematological: Negative.    Psychiatric/Behavioral: Negative.        Objective  Vitals:    05/13/21 1449   Temp: 98.4 °F (36.9 °C)       Physical Exam  Vitals signs reviewed.   Constitutional:       Appearance: Normal appearance.   HENT:      Head: Normocephalic and atraumatic.      Nose: Nose normal.      Mouth/Throat:      Pharynx: Oropharynx is clear.   Eyes:      Extraocular Movements: Extraocular movements intact.      Conjunctiva/sclera: Conjunctivae normal.      Pupils: Pupils are equal, round, and reactive to light.   Neck:      Musculoskeletal: Normal range of motion and neck supple.   Cardiovascular:      Rate and Rhythm: Normal rate and regular rhythm.      Pulses: Normal pulses.      Heart sounds: Normal heart sounds.   Pulmonary:      Effort: Pulmonary effort is normal. No respiratory distress.      Breath sounds: Normal breath sounds. No stridor.   Chest:      Chest wall: Tenderness present.   Abdominal:      General: Bowel sounds are normal.      Palpations: Abdomen is soft.   Genitourinary:      Comments: deferred  Musculoskeletal: Normal range of motion.   Skin:     General: Skin is warm and dry.      Capillary Refill: Capillary refill takes less than 2 seconds.      Findings: Erythema present.      Comments: Erythema over right chest tube site   Neurological:      General: No focal deficit present.      Mental Status: He is alert and oriented to person, place, and time.   Psychiatric:         Mood and Affect: Mood normal.         Behavior: Behavior normal.         Thought Content: Thought content normal.         Judgment: Judgment normal.

## 2021-06-24 ENCOUNTER — OFFICE VISIT (OUTPATIENT)
Dept: SURGERY | Facility: CLINIC | Age: 66
End: 2021-06-24
Payer: COMMERCIAL

## 2021-06-24 ENCOUNTER — HOSPITAL ENCOUNTER (OUTPATIENT)
Dept: RADIOLOGY | Facility: HOSPITAL | Age: 66
Discharge: HOME | End: 2021-06-24
Attending: SURGERY
Payer: OTHER MISCELLANEOUS

## 2021-06-24 VITALS
TEMPERATURE: 97.5 F | HEART RATE: 75 BPM | DIASTOLIC BLOOD PRESSURE: 83 MMHG | WEIGHT: 158.4 LBS | SYSTOLIC BLOOD PRESSURE: 122 MMHG | HEIGHT: 67 IN | BODY MASS INDEX: 24.86 KG/M2

## 2021-06-24 DIAGNOSIS — S22.41XA CLOSED FRACTURE OF MULTIPLE RIBS OF RIGHT SIDE, INITIAL ENCOUNTER: ICD-10-CM

## 2021-06-24 DIAGNOSIS — M79.641 HAND PAIN, RIGHT: Primary | ICD-10-CM

## 2021-06-24 DIAGNOSIS — J93.9 PNEUMOTHORAX ON RIGHT: ICD-10-CM

## 2021-06-24 DIAGNOSIS — M25.531 RIGHT WRIST PAIN: ICD-10-CM

## 2021-06-24 DIAGNOSIS — M79.641 HAND PAIN, RIGHT: ICD-10-CM

## 2021-06-24 PROCEDURE — 73130 X-RAY EXAM OF HAND: CPT

## 2021-06-24 PROCEDURE — 1036F TOBACCO NON-USER: CPT | Performed by: SURGERY

## 2021-06-24 PROCEDURE — 73110 X-RAY EXAM OF WRIST: CPT

## 2021-06-24 PROCEDURE — 99213 OFFICE O/P EST LOW 20 MIN: CPT | Performed by: SURGERY

## 2021-06-24 PROCEDURE — 1160F RVW MEDS BY RX/DR IN RCRD: CPT | Performed by: SURGERY

## 2021-06-24 PROCEDURE — 3008F BODY MASS INDEX DOCD: CPT | Performed by: SURGERY

## 2021-06-24 NOTE — LETTER
June 27, 2021     Patient: Craig Villa   YOB: 1955   Date of Visit: 6/24/2021       To Whom it May Concern:    Craig Villa is under my professional care  He was seen in my office on 6/24/2021  He can return to work 7/12 with no restrictions  If you have any questions or concerns, please don't hesitate to call           Sincerely,          Kandice Brunner, DO        CC: Craig Villa

## 2021-06-27 PROBLEM — M25.531 RIGHT WRIST PAIN: Status: ACTIVE | Noted: 2021-06-27

## 2021-06-27 NOTE — PROGRESS NOTES
Assessment/Plan:    Right wrist pain    Right wrist /hand without any obvious deformity  Range of motion intact  Nontender palpation  Given recent trauma will order x-rays to rule out Underlying fracture/ dislocation  Pneumothorax on right    Pneumothorax resolved on recent x-ray  Chest tube site well healed    Closed fracture of multiple ribs of right side   Mild soreness along the right thorax  No significant pain with deep inspiration  Lung fields clear on exam       Diagnoses and all orders for this visit:    Hand pain, right  -     Cancel: XR hand 2 vw right; Future  -     XR wrist 3+ vw right; Future    Pneumothorax on right    Closed fracture of multiple ribs of right side, initial encounter    Right wrist pain          Subjective:      Patient ID: Wyatt Castillo is a 76 y o  male  70-year-old gentleman status post mechanical fall with multiple right rib fractures and pneumothorax status post chest tube placement subsequent removal presents today for follow-up  Overall doing better  States the rib pain on the right has dramatically improved  Has some some mild soreness but otherwise denies any significant pain  No shortness of breath  He is back sleeping his bed in the lateral requires required  He states the chest tube site healed well  No fevers or chills  No nausea vomiting  He does complain of some right wrist/ hand pain  He said he had surgery were may be a fracture that required some sort of surgery of that right wrist years ago  The following portions of the patient's history were reviewed and updated as appropriate:   He  has no past medical history on file    He   Patient Active Problem List    Diagnosis Date Noted    Right wrist pain 06/27/2021    Closed fracture of multiple ribs of right side 04/22/2021    Pneumothorax on right 04/22/2021    Fall 04/22/2021    Obstructive sleep apnea 02/04/2021    Gastroesophageal reflux disease without esophagitis 02/04/2021 He  has a past surgical history that includes Appendectomy and Gum surgery  His family history includes No Known Problems in his father and mother  He  reports that he has never smoked  He has never used smokeless tobacco  He reports current alcohol use  He reports that he does not use drugs  Current Outpatient Medications   Medication Sig Dispense Refill    acetaminophen (TYLENOL) 325 mg tablet Take 3 tablets (975 mg total) by mouth every 8 (eight) hours  0    Aspirin Buf,CaCarb-MgCarb-MgO, 81 MG TABS Take 1 tablet by mouth daily      B Complex Vitamins (B COMPLEX 50 PO) Take 1 capsule by mouth daily      cholecalciferol (VITAMIN D3) 1,000 units tablet Take 2,000 Units by mouth daily      desloratadine (CLARINEX) 5 MG tablet Take 1 tablet (5 mg total) by mouth daily 30 tablet 3    docusate sodium (COLACE) 100 mg capsule Take 1 capsule (100 mg total) by mouth 2 (two) times a day 10 capsule 0    gabapentin (NEURONTIN) 100 mg capsule Take 1 capsule (100 mg total) by mouth 3 (three) times a day 45 capsule 0    methocarbamol (ROBAXIN) 500 mg tablet Take 1 tablet (500 mg total) by mouth every 6 (six) hours 45 tablet 0    Multiple Vitamin (multivitamin) capsule Take by mouth      oxyCODONE (ROXICODONE) 5 mg immediate release tablet 2 5 mg to 5 mg p o  Every 4 hours as needed for moderate to severe pain 30 tablet 0    pseudoephedrine (SUDAFED) 60 mg tablet Take 60 mg by mouth every 4 (four) hours as needed for congestion      senna (SENOKOT) 8 6 mg Take 2 tablets (17 2 mg total) by mouth daily  0    Suprep Bowel Prep Kit 17 5-3 13-1 6 GM/177ML SOLN Take 180 mL by mouth once for 1 dose 180 mL 0     No current facility-administered medications for this visit  He is allergic to chocolate - food allergy and ranitidine       Review of Systems   Constitutional: Negative for activity change, appetite change, chills, diaphoresis, fatigue, fever and unexpected weight change     Respiratory: Negative for cough and shortness of breath  Cardiovascular: Negative for chest pain and palpitations  Gastrointestinal: Negative for abdominal pain  Skin: Negative for color change, pallor, rash and wound  Objective:      /83 (BP Location: Right arm, Patient Position: Sitting, Cuff Size: Extra-Large)   Pulse 75   Temp 97 5 °F (36 4 °C) (Tympanic)   Ht 5' 7" (1 702 m)   Wt 71 8 kg (158 lb 6 4 oz)   BMI 24 81 kg/m²          Physical Exam  Vitals reviewed  Constitutional:       General: He is not in acute distress  Appearance: Normal appearance  He is not ill-appearing, toxic-appearing or diaphoretic  HENT:      Head: Normocephalic and atraumatic  Right Ear: External ear normal       Left Ear: External ear normal    Eyes:      General: No scleral icterus  Right eye: No discharge  Left eye: No discharge  Cardiovascular:      Rate and Rhythm: Normal rate  Pulmonary:      Effort: Pulmonary effort is normal  No respiratory distress  Breath sounds: No stridor  No wheezing, rhonchi or rales  Comments: Right-sided chest / thorax without ecchymosis  Chest tube site fully healed  Abdominal:      General: Abdomen is flat  There is no distension  Palpations: Abdomen is soft  Tenderness: There is no abdominal tenderness  Musculoskeletal:         General: No deformity or signs of injury  Normal range of motion  Cervical back: Normal range of motion  Right lower leg: No edema  Left lower leg: No edema  Comments: Right arm/ wrist/ hand without any obvious deformity  Range more chin intact  No significant tenderness  Skin:     General: Skin is warm  Coloration: Skin is not jaundiced or pale  Neurological:      General: No focal deficit present  Mental Status: He is alert and oriented to person, place, and time  Cranial Nerves: No cranial nerve deficit     Psychiatric:         Mood and Affect: Mood normal          Behavior: Behavior normal          Thought Content: Thought content normal          Judgment: Judgment normal              Imaging:    X-rays images personally reviewed by me

## 2021-06-27 NOTE — ASSESSMENT & PLAN NOTE
Right wrist /hand without any obvious deformity  Range of motion intact  Nontender palpation  Given recent trauma will order x-rays to rule out Underlying fracture/ dislocation

## 2021-06-27 NOTE — ASSESSMENT & PLAN NOTE
Mild soreness along the right thorax  No significant pain with deep inspiration    Lung fields clear on exam

## 2021-07-15 ENCOUNTER — TELEPHONE (OUTPATIENT)
Dept: SURGERY | Facility: HOSPITAL | Age: 66
End: 2021-07-15

## 2021-07-15 ENCOUNTER — ANESTHESIA (OUTPATIENT)
Dept: ANESTHESIOLOGY | Facility: HOSPITAL | Age: 66
End: 2021-07-15

## 2021-07-15 ENCOUNTER — ANESTHESIA EVENT (OUTPATIENT)
Dept: ANESTHESIOLOGY | Facility: HOSPITAL | Age: 66
End: 2021-07-15

## 2021-07-15 RX ORDER — LIDOCAINE HYDROCHLORIDE 10 MG/ML
0.5 INJECTION, SOLUTION EPIDURAL; INFILTRATION; INTRACAUDAL; PERINEURAL ONCE AS NEEDED
Status: CANCELLED | OUTPATIENT
Start: 2021-07-15

## 2021-07-15 RX ORDER — SODIUM CHLORIDE, SODIUM LACTATE, POTASSIUM CHLORIDE, CALCIUM CHLORIDE 600; 310; 30; 20 MG/100ML; MG/100ML; MG/100ML; MG/100ML
125 INJECTION, SOLUTION INTRAVENOUS CONTINUOUS
Status: CANCELLED | OUTPATIENT
Start: 2021-07-15

## 2021-07-15 NOTE — ANESTHESIA PREPROCEDURE EVALUATION
Procedure:  PRE-OP ONLY    Relevant Problems   GI/HEPATIC   (+) Gastroesophageal reflux disease without esophagitis      PULMONARY   (+) Obstructive sleep apnea   (+) Pneumothorax on right             Anesthesia Plan  ASA Score- 3     Anesthesia Type- IV sedation with anesthesia with ASA Monitors  Additional Monitors:   Airway Plan:           Plan Factors-    Chart reviewed  Patient is not a current smoker  Patient not instructed to abstain from smoking on day of procedure  Patient did not smoke on day of surgery  Induction- intravenous  Postoperative Plan-     Informed Consent- Anesthetic plan and risks discussed with patient  I personally reviewed this patient with the CRNA  Discussed and agreed on the Anesthesia Plan with the CRNA  Althea Arellano

## 2021-07-16 ENCOUNTER — ANESTHESIA EVENT (OUTPATIENT)
Dept: PERIOP | Facility: HOSPITAL | Age: 66
End: 2021-07-16

## 2021-07-16 ENCOUNTER — ANESTHESIA (OUTPATIENT)
Dept: PERIOP | Facility: HOSPITAL | Age: 66
End: 2021-07-16

## 2021-07-16 ENCOUNTER — HOSPITAL ENCOUNTER (OUTPATIENT)
Dept: PERIOP | Facility: HOSPITAL | Age: 66
Setting detail: OUTPATIENT SURGERY
Discharge: HOME | End: 2021-07-16
Attending: INTERNAL MEDICINE | Admitting: INTERNAL MEDICINE
Payer: COMMERCIAL

## 2021-07-16 VITALS
TEMPERATURE: 97 F | HEART RATE: 78 BPM | HEIGHT: 67 IN | SYSTOLIC BLOOD PRESSURE: 124 MMHG | DIASTOLIC BLOOD PRESSURE: 70 MMHG | OXYGEN SATURATION: 98 % | BODY MASS INDEX: 24.8 KG/M2 | WEIGHT: 158 LBS | RESPIRATION RATE: 20 BRPM

## 2021-07-16 DIAGNOSIS — Z12.12 SCREENING FOR COLORECTAL CANCER: ICD-10-CM

## 2021-07-16 DIAGNOSIS — K21.00 GASTROESOPHAGEAL REFLUX DISEASE WITH ESOPHAGITIS WITHOUT HEMORRHAGE: Primary | ICD-10-CM

## 2021-07-16 DIAGNOSIS — Z12.11 SCREENING FOR COLORECTAL CANCER: ICD-10-CM

## 2021-07-16 DIAGNOSIS — R13.19 ESOPHAGEAL DYSPHAGIA: ICD-10-CM

## 2021-07-16 DIAGNOSIS — K22.2 PEPTIC STRICTURE OF ESOPHAGUS: ICD-10-CM

## 2021-07-16 DIAGNOSIS — R11.11 VOMITING WITHOUT NAUSEA, INTRACTABILITY OF VOMITING NOT SPECIFIED, UNSPECIFIED VOMITING TYPE: ICD-10-CM

## 2021-07-16 PROCEDURE — C1726 CATH, BAL DIL, NON-VASCULAR: HCPCS

## 2021-07-16 PROCEDURE — 88305 TISSUE EXAM BY PATHOLOGIST: CPT | Performed by: PATHOLOGY

## 2021-07-16 PROCEDURE — 88342 IMHCHEM/IMCYTCHM 1ST ANTB: CPT | Performed by: PATHOLOGY

## 2021-07-16 PROCEDURE — 88313 SPECIAL STAINS GROUP 2: CPT | Performed by: PATHOLOGY

## 2021-07-16 PROCEDURE — G0121 COLON CA SCRN NOT HI RSK IND: HCPCS | Performed by: INTERNAL MEDICINE

## 2021-07-16 PROCEDURE — 43248 EGD GUIDE WIRE INSERTION: CPT | Performed by: INTERNAL MEDICINE

## 2021-07-16 PROCEDURE — 43239 EGD BIOPSY SINGLE/MULTIPLE: CPT | Performed by: INTERNAL MEDICINE

## 2021-07-16 RX ORDER — SODIUM CHLORIDE, SODIUM LACTATE, POTASSIUM CHLORIDE, CALCIUM CHLORIDE 600; 310; 30; 20 MG/100ML; MG/100ML; MG/100ML; MG/100ML
125 INJECTION, SOLUTION INTRAVENOUS CONTINUOUS
Status: DISCONTINUED | OUTPATIENT
Start: 2021-07-16 | End: 2021-07-20 | Stop reason: HOSPADM

## 2021-07-16 RX ORDER — PROPOFOL 10 MG/ML
INJECTION, EMULSION INTRAVENOUS CONTINUOUS PRN
Status: DISCONTINUED | OUTPATIENT
Start: 2021-07-16 | End: 2021-07-16

## 2021-07-16 RX ORDER — ONDANSETRON 2 MG/ML
4 INJECTION INTRAMUSCULAR; INTRAVENOUS ONCE AS NEEDED
Status: DISCONTINUED | OUTPATIENT
Start: 2021-07-16 | End: 2021-07-20 | Stop reason: HOSPADM

## 2021-07-16 RX ORDER — ALBUTEROL SULFATE 2.5 MG/3ML
2.5 SOLUTION RESPIRATORY (INHALATION) ONCE AS NEEDED
Status: DISCONTINUED | OUTPATIENT
Start: 2021-07-16 | End: 2021-07-20 | Stop reason: HOSPADM

## 2021-07-16 RX ORDER — OMEPRAZOLE 40 MG/1
40 CAPSULE, DELAYED RELEASE ORAL DAILY
Qty: 30 CAPSULE | Refills: 5 | Status: SHIPPED | OUTPATIENT
Start: 2021-07-16 | End: 2021-10-07 | Stop reason: SDUPTHER

## 2021-07-16 RX ORDER — PROPOFOL 10 MG/ML
INJECTION, EMULSION INTRAVENOUS AS NEEDED
Status: DISCONTINUED | OUTPATIENT
Start: 2021-07-16 | End: 2021-07-16

## 2021-07-16 RX ORDER — LIDOCAINE HYDROCHLORIDE 10 MG/ML
0.5 INJECTION, SOLUTION EPIDURAL; INFILTRATION; INTRACAUDAL; PERINEURAL ONCE AS NEEDED
Status: DISCONTINUED | OUTPATIENT
Start: 2021-07-16 | End: 2021-07-20 | Stop reason: HOSPADM

## 2021-07-16 RX ORDER — LIDOCAINE HYDROCHLORIDE 10 MG/ML
INJECTION, SOLUTION EPIDURAL; INFILTRATION; INTRACAUDAL; PERINEURAL AS NEEDED
Status: DISCONTINUED | OUTPATIENT
Start: 2021-07-16 | End: 2021-07-16

## 2021-07-16 RX ADMIN — PROPOFOL 50 MG: 10 INJECTION, EMULSION INTRAVENOUS at 10:31

## 2021-07-16 RX ADMIN — PROPOFOL 90 MG: 10 INJECTION, EMULSION INTRAVENOUS at 10:26

## 2021-07-16 RX ADMIN — PROPOFOL 140 MCG/KG/MIN: 10 INJECTION, EMULSION INTRAVENOUS at 10:42

## 2021-07-16 RX ADMIN — SODIUM CHLORIDE, SODIUM LACTATE, POTASSIUM CHLORIDE, AND CALCIUM CHLORIDE 125 ML/HR: .6; .31; .03; .02 INJECTION, SOLUTION INTRAVENOUS at 09:47

## 2021-07-16 RX ADMIN — PROPOFOL 50 MG: 10 INJECTION, EMULSION INTRAVENOUS at 10:35

## 2021-07-16 RX ADMIN — PROPOFOL 50 MG: 10 INJECTION, EMULSION INTRAVENOUS at 10:28

## 2021-07-16 RX ADMIN — PROPOFOL 50 MG: 10 INJECTION, EMULSION INTRAVENOUS at 10:38

## 2021-07-16 RX ADMIN — LIDOCAINE HYDROCHLORIDE 50 MG: 10 INJECTION, SOLUTION EPIDURAL; INFILTRATION; INTRACAUDAL; PERINEURAL at 10:26

## 2021-07-16 RX ADMIN — PROPOFOL 50 MG: 10 INJECTION, EMULSION INTRAVENOUS at 10:33

## 2021-07-16 NOTE — ANESTHESIA PREPROCEDURE EVALUATION
Procedure:  COLONOSCOPY  EGD    Relevant Problems   GI/HEPATIC   (+) Gastroesophageal reflux disease without esophagitis      PULMONARY   (+) Obstructive sleep apnea   (+) Pneumothorax on right   10 weeks ago after fall  Chest tube for  Approx  3 days  Routine CPAP use     Physical Exam    Airway    Mallampati score: I  TM Distance: >3 FB       Dental   lower dentures and upper dentures,     Cardiovascular      Pulmonary  Breath sounds clear to auscultation,     Other Findings        Anesthesia Plan  ASA Score- 3     Anesthesia Type- IV sedation with anesthesia with ASA Monitors  Additional Monitors:   Airway Plan:           Plan Factors-Exercise tolerance (METS): >4 METS  Chart reviewed  EKG reviewed  Imaging results reviewed  Existing labs reviewed  Patient summary reviewed  Patient is not a current smoker  Patient not instructed to abstain from smoking on day of procedure  Patient did not smoke on day of surgery  Induction- intravenous  Postoperative Plan-     Informed Consent- Anesthetic plan and risks discussed with patient  I personally reviewed this patient with the CRNA  Discussed and agreed on the Anesthesia Plan with the CRNA  Priscilla Dixon

## 2021-07-16 NOTE — H&P
History and Physical - [facility]  Michelle Lauren 76 y o  male MRN: [id number]                  HPI: Michelle Lauren is a 76y o  year old male who presents for dysphagia, vomiting, and colon cancer screening  REVIEW OF SYSTEMS: Per the HPI, and otherwise unremarkable  Historical Information   History reviewed  No pertinent past medical history    Past Surgical History:   Procedure Laterality Date    APPENDECTOMY      GUM SURGERY       Social History   Social History     Substance and Sexual Activity   Alcohol Use Not Currently     Social History     Substance and Sexual Activity   Drug Use Never     Social History     Tobacco Use   Smoking Status Never Smoker   Smokeless Tobacco Never Used     Family History   Problem Relation Age of Onset    No Known Problems Mother     No Known Problems Father        Meds/Allergies       Current Outpatient Medications:     acetaminophen (TYLENOL) 325 mg tablet    Aspirin Buf,CaCarb-MgCarb-MgO, 81 MG TABS    B Complex Vitamins (B COMPLEX 50 PO)    cholecalciferol (VITAMIN D3) 1,000 units tablet    desloratadine (CLARINEX) 5 MG tablet    Digestive Enzymes (PAPAYA AND ENZYMES PO)    docusate sodium (COLACE) 100 mg capsule    Garlic 10 MG CAPS    methocarbamol (ROBAXIN) 500 mg tablet    Multiple Vitamin (multivitamin) capsule    senna (SENOKOT) 8 6 mg    gabapentin (NEURONTIN) 100 mg capsule    oxyCODONE (ROXICODONE) 5 mg immediate release tablet    pseudoephedrine (SUDAFED) 60 mg tablet    Suprep Bowel Prep Kit 17 5-3 13-1 6 GM/177ML SOLN    Current Facility-Administered Medications:     lactated ringers infusion, 125 mL/hr, Intravenous, Continuous, Restarted at 07/16/21 1006    lidocaine (PF) (XYLOCAINE-MPF) 1 % injection 0 5 mL, 0 5 mL, Infiltration, Once PRN    Allergies   Allergen Reactions    Chocolate - Food Allergy Other (See Comments)     Cold sweats    Ranitidine Edema       Objective     /79   Pulse 68   Temp (!) 96 7 °F (35 9 °C) (Tympanic)   Resp 18   Ht 5' 7" (1 702 m)   Wt 71 7 kg (158 lb)   SpO2 99%   BMI 24 75 kg/m²       PHYSICAL EXAM    Gen: NAD  Head: NCAT  CV: RRR  CHEST: Clear  ABD: soft, NT/ND  EXT: no edema      ASSESSMENT/PLAN:  This is a 76y o  year old male here for upper endoscopy and colonoscopy, and he is stable and optimized for his procedure

## 2021-07-21 NOTE — RESULT ENCOUNTER NOTE
The results were negative (unremarkable)  My medical assistant will call him to let him know the results

## 2021-09-21 ENCOUNTER — RA CDI HCC (OUTPATIENT)
Dept: OTHER | Facility: HOSPITAL | Age: 66
End: 2021-09-21

## 2021-09-21 NOTE — PROGRESS NOTES
[facility]  coding opportunities       Chart reviewed, no opportunity found: CHART REVIEWED, NO OPPORTUNITY FOUND                        Patients insurance company: [employer] (Medicare Advantage and Commercial)

## 2021-09-28 ENCOUNTER — OFFICE VISIT (OUTPATIENT)
Dept: FAMILY MEDICINE CLINIC | Facility: CLINIC | Age: 66
End: 2021-09-28
Payer: COMMERCIAL

## 2021-09-28 VITALS
HEIGHT: 67 IN | TEMPERATURE: 97.5 F | WEIGHT: 163 LBS | SYSTOLIC BLOOD PRESSURE: 132 MMHG | DIASTOLIC BLOOD PRESSURE: 82 MMHG | BODY MASS INDEX: 25.58 KG/M2

## 2021-09-28 DIAGNOSIS — K40.90 DIRECT INGUINAL HERNIA OF RIGHT SIDE: ICD-10-CM

## 2021-09-28 DIAGNOSIS — Z00.00 MEDICARE ANNUAL WELLNESS VISIT, SUBSEQUENT: Primary | ICD-10-CM

## 2021-09-28 DIAGNOSIS — S22.41XA CLOSED FRACTURE OF MULTIPLE RIBS OF RIGHT SIDE: ICD-10-CM

## 2021-09-28 DIAGNOSIS — R01.1 HEART MURMUR: ICD-10-CM

## 2021-09-28 PROCEDURE — 1036F TOBACCO NON-USER: CPT | Performed by: FAMILY MEDICINE

## 2021-09-28 PROCEDURE — 3288F FALL RISK ASSESSMENT DOCD: CPT | Performed by: FAMILY MEDICINE

## 2021-09-28 PROCEDURE — 3725F SCREEN DEPRESSION PERFORMED: CPT | Performed by: FAMILY MEDICINE

## 2021-09-28 PROCEDURE — G0439 PPPS, SUBSEQ VISIT: HCPCS | Performed by: FAMILY MEDICINE

## 2021-09-28 PROCEDURE — 3008F BODY MASS INDEX DOCD: CPT | Performed by: FAMILY MEDICINE

## 2021-09-28 PROCEDURE — 1125F AMNT PAIN NOTED PAIN PRSNT: CPT | Performed by: FAMILY MEDICINE

## 2021-09-28 PROCEDURE — 1170F FXNL STATUS ASSESSED: CPT | Performed by: FAMILY MEDICINE

## 2021-09-28 PROCEDURE — 1160F RVW MEDS BY RX/DR IN RCRD: CPT | Performed by: FAMILY MEDICINE

## 2021-09-28 NOTE — PATIENT INSTRUCTIONS
Medicare Preventive Visit Patient Instructions  Thank you for completing your Welcome to Medicare Visit or Medicare Annual Wellness Visit today  Your next wellness visit will be due in one year (9/29/2022)  The screening/preventive services that you may require over the next 5-10 years are detailed below  Some tests may not apply to you based off risk factors and/or age  Screening tests ordered at today's visit but not completed yet may show as past due  Also, please note that scanned in results may not display below  Preventive Screenings:  Service Recommendations Previous Testing/Comments   Colorectal Cancer Screening  · Colonoscopy    · Fecal Occult Blood Test (FOBT)/Fecal Immunochemical Test (FIT)  · Fecal DNA/Cologuard Test  · Flexible Sigmoidoscopy Age: 54-65 years old   Colonoscopy: every 10 years (May be performed more frequently if at higher risk)  OR  FOBT/FIT: every 1 year  OR  Cologuard: every 3 years  OR  Sigmoidoscopy: every 5 years  Screening may be recommended earlier than age 48 if at higher risk for colorectal cancer  Also, an individualized decision between you and your healthcare provider will decide whether screening between the ages of 74-80 would be appropriate   Colonoscopy: 07/16/2021  FOBT/FIT: Not on file  Cologuard: Not on file  Sigmoidoscopy: Not on file    Screening Current     Prostate Cancer Screening Individualized decision between patient and health care provider in men between ages of 53-78   Medicare will cover every 12 months beginning on the day after your 50th birthday PSA: No results in last 5 years           Hepatitis C Screening Once for adults born between 80 and 1965  More frequently in patients at high risk for Hepatitis C Hep C Antibody: Not on file        Diabetes Screening 1-2 times per year if you're at risk for diabetes or have pre-diabetes Fasting glucose: No results in last 5 years   A1C: No results in last 5 years    Screening Current   Cholesterol Screening Once every 5 years if you don't have a lipid disorder  May order more often based on risk factors  Lipid panel: Not on file           Other Preventive Screenings Covered by Medicare:  1  Abdominal Aortic Aneurysm (AAA) Screening: covered once if your at risk  You're considered to be at risk if you have a family history of AAA or a male between the age of 73-68 who smoking at least 100 cigarettes in your lifetime  2  Lung Cancer Screening: covers low dose CT scan once per year if you meet all of the following conditions: (1) Age 50-69; (2) No signs or symptoms of lung cancer; (3) Current smoker or have quit smoking within the last 15 years; (4) You have a tobacco smoking history of at least 30 pack years (packs per day x number of years you smoked); (5) You get a written order from a healthcare provider  3  Glaucoma Screening: covered annually if you're considered high risk: (1) You have diabetes OR (2) Family history of glaucoma OR (3)  aged 48 and older OR (3)  American aged 72 and older  3  Osteoporosis Screening: covered every 2 years if you meet one of the following conditions: (1) Have a vertebral abnormality; (2) On glucocorticoid therapy for more than 3 months; (3) Have primary hyperparathyroidism; (4) On osteoporosis medications and need to assess response to drug therapy  5  HIV Screening: covered annually if you're between the age of 12-76  Also covered annually if you are younger than 13 and older than 72 with risk factors for HIV infection  For pregnant patients, it is covered up to 3 times per pregnancy      Immunizations:  Immunization Recommendations   Influenza Vaccine Annual influenza vaccination during flu season is recommended for all persons aged >= 6 months who do not have contraindications   Pneumococcal Vaccine (Prevnar and Pneumovax)  * Prevnar = PCV13  * Pneumovax = PPSV23 Adults 25-60 years old: 1-3 doses may be recommended based on certain risk factors  Adults 65+ years old: Prevnar (PCV13) vaccine recommended followed by Pneumovax (PPSV23) vaccine  If already received PPSV23 since turning 65, then PCV13 recommended at least one year after PPSV23 dose  Hepatitis B Vaccine 3 dose series if at intermediate or high risk (ex: diabetes, end stage renal disease, liver disease)   Tetanus (Td) Vaccine - COST NOT COVERED BY MEDICARE PART B Following completion of primary series, a booster dose should be given every 10 years to maintain immunity against tetanus  Td may also be given as tetanus wound prophylaxis  Tdap Vaccine - COST NOT COVERED BY MEDICARE PART B Recommended at least once for all adults  For pregnant patients, recommended with each pregnancy  Shingles Vaccine (Shingrix) - COST NOT COVERED BY MEDICARE PART B  2 shot series recommended in those aged 48 and above     Health Maintenance Due:      Topic Date Due    Hepatitis C Screening  Never done    Colorectal Cancer Screening  07/14/2031     Immunizations Due:      Topic Date Due    DTaP,Tdap,and Td Vaccines (1 - Tdap) Never done    Pneumococcal Vaccine: 65+ Years (2 of 2 - PPSV23) 09/26/2021    Influenza Vaccine (1) 09/01/2021     Advance Directives   What are advance directives? Advance directives are legal documents that state your wishes and plans for medical care  These plans are made ahead of time in case you lose your ability to make decisions for yourself  Advance directives can apply to any medical decision, such as the treatments you want, and if you want to donate organs  What are the types of advance directives? There are many types of advance directives, and each state has rules about how to use them  You may choose a combination of any of the following:  · Living will: This is a written record of the treatment you want  You can also choose which treatments you do not want, which to limit, and which to stop at a certain time  This includes surgery, medicine, IV fluid, and tube feedings  · Durable power of  for healthcare [facility]): This is a written record that states who you want to make healthcare choices for you when you are unable to make them for yourself  This person, called a proxy, is usually a family member or a friend  You may choose more than 1 proxy  · Do not resuscitate (DNR) order:  A DNR order is used in case your heart stops beating or you stop breathing  It is a request not to have certain forms of treatment, such as CPR  A DNR order may be included in other types of advance directives  · Medical directive: This covers the care that you want if you are in a coma, near death, or unable to make decisions for yourself  You can list the treatments you want for each condition  Treatment may include pain medicine, surgery, blood transfusions, dialysis, IV or tube feedings, and a ventilator (breathing machine)  · Values history: This document has questions about your views, beliefs, and how you feel and think about life  This information can help others choose the care that you would choose  Why are advance directives important? An advance directive helps you control your care  Although spoken wishes may be used, it is better to have your wishes written down  Spoken wishes can be misunderstood, or not followed  Treatments may be given even if you do not want them  An advance directive may make it easier for your family to make difficult choices about your care  Fall Prevention    Fall prevention  includes ways to make your home and other areas safer  It also includes ways you can move more carefully to prevent a fall  Health conditions that cause changes in your blood pressure, vision, or muscle strength and coordination may increase your risk for falls  Medicines may also increase your risk for falls if they make you dizzy, weak, or sleepy  Fall prevention tips:   · Stand or sit up slowly  · Use assistive devices as directed      · Wear shoes that fit well and have soles that   · Wear a personal alarm  · Stay active  · Manage your medical conditions  Home Safety Tips:  · Add items to prevent falls in the bathroom  · Keep paths clear  · Install bright lights in your home  · Keep items you use often on shelves within reach  · Paint or place reflective tape on the edges of your stairs  Weight Management   Why it is important to manage your weight:  Being overweight increases your risk of health conditions such as heart disease, high blood pressure, type 2 diabetes, and certain types of cancer  It can also increase your risk for osteoarthritis, sleep apnea, and other respiratory problems  Aim for a slow, steady weight loss  Even a small amount of weight loss can lower your risk of health problems  How to lose weight safely:  A safe and healthy way to lose weight is to eat fewer calories and get regular exercise  You can lose up about 1 pound a week by decreasing the number of calories you eat by 500 calories each day  Healthy meal plan for weight management:  A healthy meal plan includes a variety of foods, contains fewer calories, and helps you stay healthy  A healthy meal plan includes the following:  · Eat whole-grain foods more often  A healthy meal plan should contain fiber  Fiber is the part of grains, fruits, and vegetables that is not broken down by your body  Whole-grain foods are healthy and provide extra fiber in your diet  Some examples of whole-grain foods are whole-wheat breads and pastas, oatmeal, brown rice, and bulgur  · Eat a variety of vegetables every day  Include dark, leafy greens such as spinach, kale, amandeep greens, and mustard greens  Eat yellow and orange vegetables such as carrots, sweet potatoes, and winter squash  · Eat a variety of fruits every day  Choose fresh or canned fruit (canned in its own juice or light syrup) instead of juice  Fruit juice has very little or no fiber  · Eat low-fat dairy foods    Drink fat-free (skim) milk or 1% milk  Eat fat-free yogurt and low-fat cottage cheese  Try low-fat cheeses such as mozzarella and other reduced-fat cheeses  · Choose meat and other protein foods that are low in fat  Choose beans or other legumes such as split peas or lentils  Choose fish, skinless poultry (chicken or turkey), or lean cuts of red meat (beef or pork)  Before you cook meat or poultry, cut off any visible fat  · Use less fat and oil  Try baking foods instead of frying them  Add less fat, such as margarine, sour cream, regular salad dressing and mayonnaise to foods  Eat fewer high-fat foods  Some examples of high-fat foods include french fries, doughnuts, ice cream, and cakes  · Eat fewer sweets  Limit foods and drinks that are high in sugar  This includes candy, cookies, regular soda, and sweetened drinks  Exercise:  Exercise at least 30 minutes per day on most days of the week  Some examples of exercise include walking, biking, dancing, and swimming  You can also fit in more physical activity by taking the stairs instead of the elevator or parking farther away from stores  Ask your healthcare provider about the best exercise plan for you  © Copyright [employer] 2018 Information is for End User's use only and may not be sold, redistributed or otherwise used for commercial purposes   All illustrations and images included in CareNotes® are the copyrighted property of [employer]  or [address] [place] [address]

## 2021-09-28 NOTE — PROGRESS NOTES
Assessment and Plan:     Problem List Items Addressed This Visit     None          Depression Screening and Follow-up Plan:   Patient was screened for depression during today's encounter  They screened negative with a PHQ-2 score of 1  Falls Plan of Care: balance, strength, and gait training instructions were provided  Preventive health issues were discussed with patient, and age appropriate screening tests were ordered as noted in patient's After Visit Summary  Personalized health advice and appropriate referrals for health education or preventive services given if needed, as noted in patient's After Visit Summary  History of Present Illness:     Patient presents for [facility] to Medicare visit  Patient Care Team:  Sandra Molina DO as PCP - General (Family Medicine)     Review of Systems:     Review of Systems   Constitutional: Positive for fatigue  Negative for activity change, appetite change, diaphoresis and fever  HENT: Positive for dental problem  Negative for hearing loss  Eyes: Positive for visual disturbance  Wears corrective lenses  No glaaucoma   Respiratory: Negative for apnea, cough, chest tightness, shortness of breath and wheezing  Cardiovascular: Negative for chest pain, palpitations and leg swelling  Gastrointestinal: Negative for abdominal distention, abdominal pain, anal bleeding, constipation, diarrhea, nausea and vomiting  Esophageal erosion   No Barrets   Endocrine: Negative for cold intolerance, heat intolerance, polydipsia, polyphagia and polyuria  Genitourinary: Negative for difficulty urinating, dysuria, flank pain, hematuria and urgency  Musculoskeletal: Positive for back pain  Negative for arthralgias, gait problem, joint swelling and myalgias  Patient experiences lower thoracic upper lumbar back when he works   Skin: Negative for color change, rash and wound     Allergic/Immunologic: Negative for environmental allergies, food allergies and immunocompromised state  Neurological: Positive for headaches  Negative for dizziness, seizures, syncope, speech difficulty and numbness  Hematological: Negative for adenopathy  Does not bruise/bleed easily  Psychiatric/Behavioral: Negative for agitation, behavioral problems, hallucinations, sleep disturbance and suicidal ideas  Problem List:     Patient Active Problem List   Diagnosis    Obstructive sleep apnea    Gastroesophageal reflux disease without esophagitis    Closed fracture of multiple ribs of right side    Pneumothorax on right    Fall    Right wrist pain      Past Medical and Surgical History:     History reviewed  No pertinent past medical history  Past Surgical History:   Procedure Laterality Date    APPENDECTOMY      GUM SURGERY        Family History:     Family History   Problem Relation Age of Onset    Lymphoma Mother     Alcohol abuse Father     Heart attack Father     Lymphoma Sister       Social History:     Social History     Socioeconomic History    Marital status: /Civil Union     Spouse name: None    Number of children: None    Years of education: None    Highest education level: None   Occupational History    None   Tobacco Use    Smoking status: Never Smoker    Smokeless tobacco: Never Used   Vaping Use    Vaping Use: Never used   Substance and Sexual Activity    Alcohol use: Not Currently    Drug use: Never    Sexual activity: None   Other Topics Concern    None   Social History Narrative    None     Social Determinants of Health     Financial Resource Strain:     Difficulty of Paying Living Expenses:    Food Insecurity:     Worried About Running Out of Food in the Last Year:     Ran Out of Food in the Last Year:    Transportation Needs:     Lack of Transportation (Medical):      Lack of Transportation (Non-Medical):    Physical Activity:     Days of Exercise per Week:     Minutes of Exercise per Session:    Stress:     Feeling of Stress :    Social Connections:     Frequency of Communication with Friends and Family:     Frequency of Social Gatherings with Friends and Family:     Attends Confucianism Services:     Active Member of Clubs or Organizations:     Attends Club or Organization Meetings:     Marital Status:    Intimate Partner Violence:     Fear of Current or Ex-Partner:     Emotionally Abused:     Physically Abused:     Sexually Abused:       Medications and Allergies:     Current Outpatient Medications   Medication Sig Dispense Refill    Aspirin Buf,CaCarb-MgCarb-MgO, 81 MG TABS Take 1 tablet by mouth daily      B Complex Vitamins (B COMPLEX 50 PO) Take 1 capsule by mouth daily      cholecalciferol (VITAMIN D3) 1,000 units tablet Take 2,000 Units by mouth daily      Digestive Enzymes (PAPAYA AND ENZYMES PO) Take 1 tablet by mouth daily      Garlic 10 MG CAPS Take 1 capsule by mouth daily      Multiple Vitamin (multivitamin) capsule Take by mouth      pseudoephedrine (SUDAFED) 60 mg tablet Take 60 mg by mouth every 4 (four) hours as needed for congestion      acetaminophen (TYLENOL) 325 mg tablet Take 3 tablets (975 mg total) by mouth every 8 (eight) hours  0    desloratadine (CLARINEX) 5 MG tablet Take 1 tablet (5 mg total) by mouth daily (Patient not taking: Reported on 9/28/2021) 30 tablet 3    docusate sodium (COLACE) 100 mg capsule Take 1 capsule (100 mg total) by mouth 2 (two) times a day (Patient not taking: Reported on 9/28/2021) 10 capsule 0    gabapentin (NEURONTIN) 100 mg capsule Take 1 capsule (100 mg total) by mouth 3 (three) times a day (Patient not taking: Reported on 9/28/2021) 45 capsule 0    methocarbamol (ROBAXIN) 500 mg tablet Take 1 tablet (500 mg total) by mouth every 6 (six) hours 45 tablet 0    omeprazole (PriLOSEC) 40 MG capsule Take 1 capsule (40 mg total) by mouth daily 30 capsule 5    oxyCODONE (ROXICODONE) 5 mg immediate release tablet 2 5 mg to 5 mg p o   Every 4 hours as needed for moderate to severe pain 30 tablet 0    senna (SENOKOT) 8 6 mg Take 2 tablets (17 2 mg total) by mouth daily  0    Suprep Bowel Prep Kit 17 5-3 13-1 6 GM/177ML SOLN Take 180 mL by mouth once for 1 dose 180 mL 0     No current facility-administered medications for this visit  Allergies   Allergen Reactions    Chocolate - Food Allergy Other (See Comments)     Cold sweats    Ranitidine Edema      Immunizations:     Immunization History   Administered Date(s) Administered    INFLUENZA 11/18/2017, 10/01/2020    Influenza, seasonal, injectable 12/14/2013    Pneumococcal Conjugate 13-Valent 09/26/2020    Pneumococcal Polysaccharide PPV23 04/11/2016    SARS-CoV-2 / COVID-19 mRNA IM (Ferman Epley) 03/26/2021, 04/28/2021      Health Maintenance:         Topic Date Due    Hepatitis C Screening  Never done    Colorectal Cancer Screening  07/14/2031         Topic Date Due    DTaP,Tdap,and Td Vaccines (1 - Tdap) Never done    Pneumococcal Vaccine: 65+ Years (2 of 2 - PPSV23) 09/26/2021    Influenza Vaccine (1) 09/01/2021      Medicare Screening Tests and Risk Assessments:     Sarah Stark is here for his Subsequent Wellness visit  Health Risk Assessment:   Patient rates overall health as good  Patient feels that their physical health rating is slightly better  Patient is very satisfied with their life  Eyesight was rated as same  Hearing was rated as same  Patient feels that their emotional and mental health rating is slightly better  Patients states they are never, rarely angry  Patient states they are sometimes unusually tired/fatigued  Pain experienced in the last 7 days has been some  Patient's pain rating has been 2/10  Patient states that he has experienced no weight loss or gain in last 6 months  Depression Screening:   PHQ-2 Score: 1      Fall Risk Screening:    In the past year, patient has experienced: history of falling in past year    Number of falls: 1  Injured during fall?: Yes    Feels unsteady when standing or walking?: No    Worried about falling?: No      Home Safety:  Patient does not have trouble with stairs inside or outside of their home  Patient has working smoke alarms and has working carbon monoxide detector  Home safety hazards include: none  Nutrition:   Current diet is Regular and Limited junk food  Medications:   Patient is currently taking over-the-counter supplements  OTC medications include: see medication list  Patient is able to manage medications  Activities of Daily Living (ADLs)/Instrumental Activities of Daily Living (IADLs):   Walk and transfer into and out of bed and chair?: Yes  Dress and groom yourself?: Yes    Bathe or shower yourself?: Yes    Feed yourself? Yes  Do your laundry/housekeeping?: Yes  Manage your money, pay your bills and track your expenses?: Yes  Make your own meals?: Yes    Do your own shopping?: Yes    Previous Hospitalizations:   Any hospitalizations or ED visits within the last 12 months?: Yes    How many hospitalizations have you had in the last year?: 1-2    Advance Care Planning:   Living will: Yes    Durable POA for healthcare:  Yes    Advanced directive: Yes    Advanced directive counseling given: Yes    Five wishes given: No    Patient declined ACP directive: No    End of Life Decisions reviewed with patient: Yes    Provider agrees with end of life decisions: Yes      Cognitive Screening:   Provider or family/friend/caregiver concerned regarding cognition?: No    PREVENTIVE SCREENINGS        Diabetes Screening:     General: Screening Current      Colorectal Cancer Screening:     General: Screening Current      Prostate Cancer Screening:    General: Screening Not Indicated      Osteoporosis Screening:    General: Screening Not Indicated      Abdominal Aortic Aneurysm (AAA) Screening:    Risk factors include: age between 73-67 yo        Lung Cancer Screening:     General: Screening Not Indicated      Hepatitis C Screening:    General: Screening Not Indicated    Screening, Brief Intervention, and Referral to Treatment (SBIRT)    Screening  Typical number of drinks in a day: 0  Typical number of drinks in a week: 0  Interpretation: Low risk drinking behavior  Single Item Drug Screening:  How often have you used an illegal drug (including marijuana) or a prescription medication for non-medical reasons in the past year? never    Single Item Drug Screen Score: 0  Interpretation: Negative screen for possible drug use disorder    No exam data present     Physical Exam:     /82 (BP Location: Left arm, Patient Position: Sitting, Cuff Size: Standard)   Temp 97 5 °F (36 4 °C) (Temporal)   Ht 5' 7" (1 702 m)   Wt 73 9 kg (163 lb)   BMI 25 53 kg/m²     Physical Exam  Constitutional:       Appearance: He is well-developed  HENT:      Head: Normocephalic and atraumatic  Right Ear: External ear normal       Left Ear: External ear normal       Nose: Nose normal    Eyes:      Conjunctiva/sclera: Conjunctivae normal       Pupils: Pupils are equal, round, and reactive to light  Cardiovascular:      Rate and Rhythm: Normal rate and regular rhythm  Heart sounds: Murmur heard  No friction rub  Pulmonary:      Effort: Pulmonary effort is normal  No respiratory distress  Breath sounds: Normal breath sounds  No wheezing or rales  Chest:      Chest wall: No tenderness  Abdominal:      General: Bowel sounds are normal       Palpations: Abdomen is soft  Genitourinary:     Comments: Right direct inguinal hernia currently reducible  Musculoskeletal:         General: Normal range of motion  Cervical back: Normal range of motion and neck supple  Skin:     General: Skin is warm and dry  Capillary Refill: Capillary refill takes 2 to 3 seconds  Neurological:      Mental Status: He is alert and oriented to person, place, and time  Psychiatric:         Behavior: Behavior normal          Thought Content:  Thought content normal  Judgment: Judgment normal           Kayla Naik DO

## 2021-10-07 ENCOUNTER — OFFICE VISIT (OUTPATIENT)
Dept: GASTROENTEROLOGY | Facility: CLINIC | Age: 66
End: 2021-10-07
Payer: COMMERCIAL

## 2021-10-07 VITALS
HEIGHT: 67 IN | HEART RATE: 65 BPM | DIASTOLIC BLOOD PRESSURE: 74 MMHG | SYSTOLIC BLOOD PRESSURE: 113 MMHG | TEMPERATURE: 98.5 F | WEIGHT: 162 LBS | BODY MASS INDEX: 25.43 KG/M2

## 2021-10-07 DIAGNOSIS — K21.00 GASTROESOPHAGEAL REFLUX DISEASE WITH ESOPHAGITIS WITHOUT HEMORRHAGE: ICD-10-CM

## 2021-10-07 DIAGNOSIS — K22.2 PEPTIC STRICTURE OF ESOPHAGUS: ICD-10-CM

## 2021-10-07 PROCEDURE — 4040F PNEUMOC VAC/ADMIN/RCVD: CPT | Performed by: NURSE PRACTITIONER

## 2021-10-07 PROCEDURE — 1036F TOBACCO NON-USER: CPT | Performed by: NURSE PRACTITIONER

## 2021-10-07 PROCEDURE — 99213 OFFICE O/P EST LOW 20 MIN: CPT | Performed by: NURSE PRACTITIONER

## 2021-10-07 PROCEDURE — 3008F BODY MASS INDEX DOCD: CPT | Performed by: NURSE PRACTITIONER

## 2021-10-07 PROCEDURE — 1160F RVW MEDS BY RX/DR IN RCRD: CPT | Performed by: NURSE PRACTITIONER

## 2021-10-07 RX ORDER — OMEPRAZOLE 40 MG/1
40 CAPSULE, DELAYED RELEASE ORAL DAILY
Qty: 30 CAPSULE | Refills: 5 | Status: SHIPPED | OUTPATIENT
Start: 2021-10-07 | End: 2022-04-19 | Stop reason: SDUPTHER

## 2021-10-25 ENCOUNTER — TELEPHONE (OUTPATIENT)
Dept: SURGERY | Facility: HOSPITAL | Age: 66
End: 2021-10-25

## 2021-10-26 ENCOUNTER — TELEPHONE (OUTPATIENT)
Dept: SURGERY | Facility: HOSPITAL | Age: 66
End: 2021-10-26

## 2021-10-27 ENCOUNTER — HOSPITAL ENCOUNTER (OUTPATIENT)
Dept: PERIOP | Facility: HOSPITAL | Age: 66
Setting detail: OUTPATIENT SURGERY
Discharge: HOME | End: 2021-10-27
Payer: COMMERCIAL

## 2021-10-27 ENCOUNTER — ANESTHESIA (OUTPATIENT)
Dept: PERIOP | Facility: HOSPITAL | Age: 66
End: 2021-10-27

## 2021-10-27 ENCOUNTER — ANESTHESIA EVENT (OUTPATIENT)
Dept: PERIOP | Facility: HOSPITAL | Age: 66
End: 2021-10-27

## 2021-10-27 VITALS
TEMPERATURE: 98 F | DIASTOLIC BLOOD PRESSURE: 66 MMHG | HEIGHT: 67 IN | BODY MASS INDEX: 25.43 KG/M2 | HEART RATE: 61 BPM | SYSTOLIC BLOOD PRESSURE: 106 MMHG | OXYGEN SATURATION: 97 % | WEIGHT: 162 LBS | RESPIRATION RATE: 18 BRPM

## 2021-10-27 DIAGNOSIS — K22.2 PEPTIC STRICTURE OF ESOPHAGUS: ICD-10-CM

## 2021-10-27 DIAGNOSIS — K21.00 GASTROESOPHAGEAL REFLUX DISEASE WITH ESOPHAGITIS WITHOUT HEMORRHAGE: ICD-10-CM

## 2021-10-27 PROCEDURE — 43239 EGD BIOPSY SINGLE/MULTIPLE: CPT | Performed by: INTERNAL MEDICINE

## 2021-10-27 PROCEDURE — 88305 TISSUE EXAM BY PATHOLOGIST: CPT | Performed by: PATHOLOGY

## 2021-10-27 RX ORDER — SODIUM CHLORIDE, SODIUM LACTATE, POTASSIUM CHLORIDE, CALCIUM CHLORIDE 600; 310; 30; 20 MG/100ML; MG/100ML; MG/100ML; MG/100ML
125 INJECTION, SOLUTION INTRAVENOUS CONTINUOUS
Status: DISCONTINUED | OUTPATIENT
Start: 2021-10-27 | End: 2021-10-31 | Stop reason: HOSPADM

## 2021-10-27 RX ORDER — LIDOCAINE HYDROCHLORIDE 20 MG/ML
INJECTION, SOLUTION EPIDURAL; INFILTRATION; INTRACAUDAL; PERINEURAL AS NEEDED
Status: DISCONTINUED | OUTPATIENT
Start: 2021-10-27 | End: 2021-10-27

## 2021-10-27 RX ORDER — PROPOFOL 10 MG/ML
INJECTION, EMULSION INTRAVENOUS AS NEEDED
Status: DISCONTINUED | OUTPATIENT
Start: 2021-10-27 | End: 2021-10-27

## 2021-10-27 RX ADMIN — PROPOFOL 40 MG: 10 INJECTION, EMULSION INTRAVENOUS at 12:44

## 2021-10-27 RX ADMIN — PROPOFOL 40 MG: 10 INJECTION, EMULSION INTRAVENOUS at 12:46

## 2021-10-27 RX ADMIN — PROPOFOL 40 MG: 10 INJECTION, EMULSION INTRAVENOUS at 12:41

## 2021-10-27 RX ADMIN — SODIUM CHLORIDE, SODIUM LACTATE, POTASSIUM CHLORIDE, AND CALCIUM CHLORIDE 125 ML/HR: .6; .31; .03; .02 INJECTION, SOLUTION INTRAVENOUS at 10:58

## 2021-10-27 RX ADMIN — LIDOCAINE HYDROCHLORIDE 100 MG: 20 INJECTION, SOLUTION EPIDURAL; INFILTRATION; INTRACAUDAL at 12:39

## 2021-10-27 RX ADMIN — PROPOFOL 80 MG: 10 INJECTION, EMULSION INTRAVENOUS at 12:39

## 2021-11-02 ENCOUNTER — TELEPHONE (OUTPATIENT)
Dept: PULMONOLOGY | Facility: CLINIC | Age: 66
End: 2021-11-02

## 2022-03-15 ENCOUNTER — RA CDI HCC (OUTPATIENT)
Dept: OTHER | Facility: HOSPITAL | Age: 67
End: 2022-03-15

## 2022-03-15 NOTE — PROGRESS NOTES
[facility]  coding opportunities       Chart reviewed, no opportunity found:   [address]        Patients Insurance     Medicare Insurance: [employer] Advantage

## 2022-04-19 ENCOUNTER — OFFICE VISIT (OUTPATIENT)
Dept: FAMILY MEDICINE CLINIC | Facility: CLINIC | Age: 67
End: 2022-04-19
Payer: COMMERCIAL

## 2022-04-19 VITALS
WEIGHT: 156 LBS | BODY MASS INDEX: 24.48 KG/M2 | HEIGHT: 67 IN | DIASTOLIC BLOOD PRESSURE: 92 MMHG | SYSTOLIC BLOOD PRESSURE: 132 MMHG | TEMPERATURE: 97.8 F

## 2022-04-19 DIAGNOSIS — K21.00 GASTROESOPHAGEAL REFLUX DISEASE WITH ESOPHAGITIS WITHOUT HEMORRHAGE: ICD-10-CM

## 2022-04-19 DIAGNOSIS — K22.2 PEPTIC STRICTURE OF ESOPHAGUS: ICD-10-CM

## 2022-04-19 DIAGNOSIS — E78.2 MIXED HYPERLIPIDEMIA: Primary | ICD-10-CM

## 2022-04-19 PROCEDURE — 3008F BODY MASS INDEX DOCD: CPT | Performed by: FAMILY MEDICINE

## 2022-04-19 PROCEDURE — 3725F SCREEN DEPRESSION PERFORMED: CPT | Performed by: FAMILY MEDICINE

## 2022-04-19 PROCEDURE — 99213 OFFICE O/P EST LOW 20 MIN: CPT | Performed by: FAMILY MEDICINE

## 2022-04-19 PROCEDURE — 1160F RVW MEDS BY RX/DR IN RCRD: CPT | Performed by: FAMILY MEDICINE

## 2022-04-19 PROCEDURE — 1036F TOBACCO NON-USER: CPT | Performed by: FAMILY MEDICINE

## 2022-04-19 RX ORDER — OMEPRAZOLE 40 MG/1
40 CAPSULE, DELAYED RELEASE ORAL DAILY
Qty: 30 CAPSULE | Refills: 5 | Status: SHIPPED | OUTPATIENT
Start: 2022-04-19

## 2022-04-19 NOTE — PROGRESS NOTES
Depression Screening and Follow-up Plan: Patient was screened for depression during today's encounter  They screened negative with a PHQ-2 score of 0  Assessment/Plan:    Problem List Items Addressed This Visit     None           There are no diagnoses linked to this encounter  No problem-specific Assessment & Plan notes found for this encounter  Subjective:      Patient ID: Herminio Pierre is a 78 y o  male  Mr Hedrick Ask here today for a follow-up of visit he did accomplish a getting his diseased teeth out of his upper mouth and he is working on getting plates he still has a few teeth left on the bottom which will be removed when he is able to get plates for dentures on blood pressure is good 132/80 to his weight is holding steady lost a couple lb but that probably goes he is not eating well right now and on a 12 system review is negative      The following portions of the patient's history were reviewed and updated as appropriate:   He has a past medical history of Collapse of right lung and GERD (gastroesophageal reflux disease)  ,  does not have any pertinent problems on file  ,   has a past surgical history that includes Appendectomy; Gum surgery (04/01/2022); and Colonoscopy  ,  family history includes Alcohol abuse in his father; Heart attack in his father; Lymphoma in his mother and sister  ,   reports that he has never smoked  He has never used smokeless tobacco  He reports previous alcohol use  He reports that he does not use drugs  ,  is allergic to chocolate - food allergy and ranitidine     Current Outpatient Medications   Medication Sig Dispense Refill    Aspirin Buf,CaCarb-MgCarb-MgO, 81 MG TABS Take 1 tablet by mouth daily      B Complex Vitamins (B COMPLEX 50 PO) Take 1 capsule by mouth daily      cholecalciferol (VITAMIN D3) 1,000 units tablet Take 2,000 Units by mouth daily      Garlic 10 MG CAPS Take 1 capsule by mouth daily      Multiple Vitamin (multivitamin) capsule Take by mouth  Omega 3-6-9 Fatty Acids (OMEGA 3-6-9 PO) Take by mouth in the morning      omeprazole (PriLOSEC) 40 MG capsule Take 1 capsule (40 mg total) by mouth daily 30 capsule 5    pseudoephedrine (SUDAFED) 60 mg tablet Take 60 mg by mouth every 4 (four) hours as needed for congestion      Digestive Enzymes (PAPAYA AND ENZYMES PO) Take 1 tablet by mouth daily (Patient not taking: Reported on 4/19/2022 )      Suprep Bowel Prep Kit 17 5-3 13-1 6 GM/177ML SOLN Take 180 mL by mouth once for 1 dose 180 mL 0     No current facility-administered medications for this visit  Review of Systems   Constitutional: Negative for activity change, appetite change, diaphoresis, fatigue and fever  HENT: Positive for dental problem and hearing loss  Eyes: Positive for visual disturbance  Patient id wears corrective lenses but he does not have glaucoma or macular degeneration patient has had bilateral cataract extractions with intra-ocular lens implants   Respiratory: Negative for apnea, cough, chest tightness, shortness of breath and wheezing  Cardiovascular: Negative for chest pain, palpitations and leg swelling  Gastrointestinal: Negative for abdominal distention, abdominal pain, anal bleeding, constipation, diarrhea, nausea and vomiting  Endocrine: Negative for cold intolerance, heat intolerance, polydipsia, polyphagia and polyuria  Genitourinary: Negative for difficulty urinating, dysuria, flank pain, hematuria and urgency  Musculoskeletal: Positive for arthralgias  Negative for back pain, gait problem, joint swelling and myalgias  Skin: Negative for color change, rash and wound  Allergic/Immunologic: Negative for environmental allergies, food allergies and immunocompromised state  Neurological: Negative for dizziness, seizures, syncope, speech difficulty, numbness and headaches  Hematological: Negative for adenopathy  Does not bruise/bleed easily     Psychiatric/Behavioral: Negative for agitation, behavioral problems, hallucinations, sleep disturbance and suicidal ideas  Objective:  Vitals:    04/19/22 1658   BP: 132/92   BP Location: Left arm   Patient Position: Sitting   Cuff Size: Standard   Temp: 97 8 °F (36 6 °C)   TempSrc: Temporal   Weight: 70 8 kg (156 lb)   Height: 5' 7" (1 702 m)     Body mass index is 24 43 kg/m²  Physical Exam  Constitutional:       General: He is not in acute distress  Appearance: Normal appearance  He is well-developed  He is not diaphoretic  HENT:      Head: Normocephalic  Right Ear: External ear normal       Left Ear: External ear normal       Nose: Nose normal    Eyes:      General: No scleral icterus  Right eye: No discharge  Left eye: No discharge  Conjunctiva/sclera: Conjunctivae normal       Pupils: Pupils are equal, round, and reactive to light  Neck:      Thyroid: No thyromegaly  Trachea: No tracheal deviation  Cardiovascular:      Rate and Rhythm: Normal rate and regular rhythm  Heart sounds: Normal heart sounds  No murmur heard  No friction rub  No gallop  Pulmonary:      Effort: Pulmonary effort is normal  No respiratory distress  Breath sounds: Normal breath sounds  No wheezing  Abdominal:      General: Bowel sounds are normal       Palpations: Abdomen is soft  There is no mass  Tenderness: There is no abdominal tenderness  There is no guarding  Musculoskeletal:         General: No deformity  Cervical back: Normal range of motion  Lymphadenopathy:      Cervical: No cervical adenopathy  Skin:     General: Skin is warm and dry  Findings: No erythema or rash  Neurological:      Mental Status: He is alert and oriented to person, place, and time  Cranial Nerves: No cranial nerve deficit  Psychiatric:         Thought Content:  Thought content normal

## 2022-04-20 ENCOUNTER — TELEPHONE (OUTPATIENT)
Dept: ADMINISTRATIVE | Facility: OTHER | Age: 67
End: 2022-04-20

## 2022-04-20 NOTE — TELEPHONE ENCOUNTER
----- Message from [place] Maria Eugenia sent at 4/19/2022  5:06 PM EDT -----  Regarding: Injection - Influenza  04/19/22 5:07 PM    Hello, our patient Geovanna López has had Immunization(s) completed/performed  Please assist in updating the patient chart by pulling a previous Electronic Medical Record (EMR) document  The previous EMR is in immunization list  The date of service is 11/2021   Influenza still showing due on Health Maintenance    Thank you,  Xiao Hobson   [facility]

## 2022-06-18 ENCOUNTER — LAB (OUTPATIENT)
Dept: LAB | Facility: MEDICAL CENTER | Age: 67
End: 2022-06-18
Payer: COMMERCIAL

## 2022-06-18 DIAGNOSIS — E78.2 MIXED HYPERLIPIDEMIA: ICD-10-CM

## 2022-06-18 LAB
ALBUMIN SERPL BCP-MCNC: 3.6 G/DL (ref 3.5–5)
ALP SERPL-CCNC: 79 U/L (ref 46–116)
ALT SERPL W P-5'-P-CCNC: 52 U/L (ref 12–78)
ANION GAP SERPL CALCULATED.3IONS-SCNC: 4 MMOL/L (ref 4–13)
AST SERPL W P-5'-P-CCNC: 36 U/L (ref 5–45)
BILIRUB SERPL-MCNC: 0.47 MG/DL (ref 0.2–1)
BUN SERPL-MCNC: 28 MG/DL (ref 5–25)
CALCIUM SERPL-MCNC: 8.6 MG/DL (ref 8.3–10.1)
CHLORIDE SERPL-SCNC: 108 MMOL/L (ref 100–108)
CHOLEST SERPL-MCNC: 141 MG/DL
CO2 SERPL-SCNC: 29 MMOL/L (ref 21–32)
CREAT SERPL-MCNC: 0.73 MG/DL (ref 0.6–1.3)
GFR SERPL CREATININE-BSD FRML MDRD: 95 ML/MIN/1.73SQ M
GLUCOSE P FAST SERPL-MCNC: 98 MG/DL (ref 65–99)
HDLC SERPL-MCNC: 63 MG/DL
LDLC SERPL CALC-MCNC: 71 MG/DL (ref 0–100)
NONHDLC SERPL-MCNC: 78 MG/DL
POTASSIUM SERPL-SCNC: 4.1 MMOL/L (ref 3.5–5.3)
PROT SERPL-MCNC: 6.9 G/DL (ref 6.4–8.2)
SODIUM SERPL-SCNC: 141 MMOL/L (ref 136–145)
TRIGL SERPL-MCNC: 35 MG/DL

## 2022-06-18 PROCEDURE — 80053 COMPREHEN METABOLIC PANEL: CPT

## 2022-06-18 PROCEDURE — 36415 COLL VENOUS BLD VENIPUNCTURE: CPT

## 2022-06-18 PROCEDURE — 80061 LIPID PANEL: CPT | Performed by: FAMILY MEDICINE

## 2022-10-01 ENCOUNTER — RA CDI HCC (OUTPATIENT)
Dept: OTHER | Facility: HOSPITAL | Age: 67
End: 2022-10-01

## 2022-10-01 NOTE — PROGRESS NOTES
[facility]  coding opportunities       Chart reviewed, no opportunity found:   [address]        Patients Insurance     Medicare Insurance: [employer]vantage

## 2022-10-04 ENCOUNTER — OFFICE VISIT (OUTPATIENT)
Dept: FAMILY MEDICINE CLINIC | Facility: CLINIC | Age: 67
End: 2022-10-04
Payer: COMMERCIAL

## 2022-10-04 VITALS
BODY MASS INDEX: 24.17 KG/M2 | OXYGEN SATURATION: 98 % | TEMPERATURE: 98.9 F | DIASTOLIC BLOOD PRESSURE: 84 MMHG | SYSTOLIC BLOOD PRESSURE: 132 MMHG | HEIGHT: 67 IN | WEIGHT: 154 LBS | HEART RATE: 64 BPM

## 2022-10-04 DIAGNOSIS — Z00.00 MEDICARE ANNUAL WELLNESS VISIT, SUBSEQUENT: Primary | ICD-10-CM

## 2022-10-04 DIAGNOSIS — K21.00 GASTROESOPHAGEAL REFLUX DISEASE WITH ESOPHAGITIS WITHOUT HEMORRHAGE: ICD-10-CM

## 2022-10-04 DIAGNOSIS — K22.2 PEPTIC STRICTURE OF ESOPHAGUS: ICD-10-CM

## 2022-10-04 DIAGNOSIS — Z23 NEED FOR IMMUNIZATION AGAINST INFLUENZA: ICD-10-CM

## 2022-10-04 PROCEDURE — G0008 ADMIN INFLUENZA VIRUS VAC: HCPCS | Performed by: FAMILY MEDICINE

## 2022-10-04 PROCEDURE — 90662 IIV NO PRSV INCREASED AG IM: CPT | Performed by: FAMILY MEDICINE

## 2022-10-04 PROCEDURE — G0439 PPPS, SUBSEQ VISIT: HCPCS | Performed by: FAMILY MEDICINE

## 2022-10-04 RX ORDER — OMEPRAZOLE 40 MG/1
40 CAPSULE, DELAYED RELEASE ORAL DAILY
Qty: 30 CAPSULE | Refills: 5 | Status: SHIPPED | OUTPATIENT
Start: 2022-10-04

## 2022-10-04 NOTE — PATIENT INSTRUCTIONS
Medicare Preventive Visit Patient Instructions  Thank you for completing your Welcome to Medicare Visit or Medicare Annual Wellness Visit today  Your next wellness visit will be due in one year (10/5/2023)  The screening/preventive services that you may require over the next 5-10 years are detailed below  Some tests may not apply to you based off risk factors and/or age  Screening tests ordered at today's visit but not completed yet may show as past due  Also, please note that scanned in results may not display below  Preventive Screenings:  Service Recommendations Previous Testing/Comments   Colorectal Cancer Screening  · Colonoscopy    · Fecal Occult Blood Test (FOBT)/Fecal Immunochemical Test (FIT)  · Fecal DNA/Cologuard Test  · Flexible Sigmoidoscopy Age: 39-70 years old   Colonoscopy: every 10 years (May be performed more frequently if at higher risk)  OR  FOBT/FIT: every 1 year  OR  Cologuard: every 3 years  OR  Sigmoidoscopy: every 5 years  Screening may be recommended earlier than age 39 if at higher risk for colorectal cancer  Also, an individualized decision between you and your healthcare provider will decide whether screening between the ages of 74-80 would be appropriate   Colonoscopy: 07/16/2021  FOBT/FIT: Not on file  Cologuard: Not on file  Sigmoidoscopy: Not on file    Screening Current     Prostate Cancer Screening Individualized decision between patient and health care provider in men between ages of 53-78   Medicare will cover every 12 months beginning on the day after your 50th birthday PSA: No results in last 5 years           Hepatitis C Screening Once for adults born between [birth year] and 1965  More frequently in patients at high risk for Hepatitis C Hep C Antibody: Not on file        Diabetes Screening 1-2 times per year if you're at risk for diabetes or have pre-diabetes Fasting glucose: 98 mg/dL (6/18/2022)  A1C: No results in last 5 years (No results in last 5 years)  Screening Current Cholesterol Screening Once every 5 years if you don't have a lipid disorder  May order more often based on risk factors  Lipid panel: 06/18/2022  Screening Not Indicated  History Lipid Disorder      Other Preventive Screenings Covered by Medicare:  1  Abdominal Aortic Aneurysm (AAA) Screening: covered once if your at risk  You're considered to be at risk if you have a family history of AAA or a male between the age of 73-68 who smoking at least 100 cigarettes in your lifetime  2  Lung Cancer Screening: covers low dose CT scan once per year if you meet all of the following conditions: (1) Age 50-69; (2) No signs or symptoms of lung cancer; (3) Current smoker or have quit smoking within the last 15 years; (4) You have a tobacco smoking history of at least 20 pack years (packs per day x number of years you smoked); (5) You get a written order from a healthcare provider  3  Glaucoma Screening: covered annually if you're considered high risk: (1) You have diabetes OR (2) Family history of glaucoma OR (3)  aged 48 and older OR (3)  American aged 72 and older  3  Osteoporosis Screening: covered every 2 years if you meet one of the following conditions: (1) Have a vertebral abnormality; (2) On glucocorticoid therapy for more than 3 months; (3) Have primary hyperparathyroidism; (4) On osteoporosis medications and need to assess response to drug therapy  5  HIV Screening: covered annually if you're between the age of 12-76  Also covered annually if you are younger than 13 and older than 72 with risk factors for HIV infection  For pregnant patients, it is covered up to 3 times per pregnancy      Immunizations:  Immunization Recommendations   Influenza Vaccine Annual influenza vaccination during flu season is recommended for all persons aged >= 6 months who do not have contraindications   Pneumococcal Vaccine   * Pneumococcal conjugate vaccine = PCV13 (Prevnar 13), PCV15 (Vaxneuvance), PCV20 (Prevnar 20)  * Pneumococcal polysaccharide vaccine = PPSV23 (Pneumovax) Adults 2364 years old: 1-3 doses may be recommended based on certain risk factors  Adults 72 years old: 1-2 doses may be recommended based off what pneumonia vaccine you previously received   Hepatitis B Vaccine 3 dose series if at intermediate or high risk (ex: diabetes, end stage renal disease, liver disease)   Tetanus (Td) Vaccine - COST NOT COVERED BY MEDICARE PART B Following completion of primary series, a booster dose should be given every 10 years to maintain immunity against tetanus  Td may also be given as tetanus wound prophylaxis  Tdap Vaccine - COST NOT COVERED BY MEDICARE PART B Recommended at least once for all adults  For pregnant patients, recommended with each pregnancy  Shingles Vaccine (Shingrix) - COST NOT COVERED BY MEDICARE PART B  2 shot series recommended in those aged 48 and above     Health Maintenance Due:      Topic Date Due    Hepatitis C Screening  Never done    Colorectal Cancer Screening  07/14/2031     Immunizations Due:      Topic Date Due    COVID-19 Vaccine (3 - Booster for Moderna series) 09/28/2021    Influenza Vaccine (1) 09/01/2022     Advance Directives   What are advance directives? Advance directives are legal documents that state your wishes and plans for medical care  These plans are made ahead of time in case you lose your ability to make decisions for yourself  Advance directives can apply to any medical decision, such as the treatments you want, and if you want to donate organs  What are the types of advance directives? There are many types of advance directives, and each state has rules about how to use them  You may choose a combination of any of the following:  · Living will: This is a written record of the treatment you want  You can also choose which treatments you do not want, which to limit, and which to stop at a certain time   This includes surgery, medicine, IV fluid, and tube feedings  · Durable power of  for healthcare [facility]): This is a written record that states who you want to make healthcare choices for you when you are unable to make them for yourself  This person, called a proxy, is usually a family member or a friend  You may choose more than 1 proxy  · Do not resuscitate (DNR) order:  A DNR order is used in case your heart stops beating or you stop breathing  It is a request not to have certain forms of treatment, such as CPR  A DNR order may be included in other types of advance directives  · Medical directive: This covers the care that you want if you are in a coma, near death, or unable to make decisions for yourself  You can list the treatments you want for each condition  Treatment may include pain medicine, surgery, blood transfusions, dialysis, IV or tube feedings, and a ventilator (breathing machine)  · Values history: This document has questions about your views, beliefs, and how you feel and think about life  This information can help others choose the care that you would choose  Why are advance directives important? An advance directive helps you control your care  Although spoken wishes may be used, it is better to have your wishes written down  Spoken wishes can be misunderstood, or not followed  Treatments may be given even if you do not want them  An advance directive may make it easier for your family to make difficult choices about your care  © Copyright [employer] 2018 Information is for End User's use only and may not be sold, redistributed or otherwise used for commercial purposes   All illustrations and images included in CareNotes® are the copyrighted property of A D A M , Inc  or [address] [place] [address]"[employer]"

## 2022-10-04 NOTE — PROGRESS NOTES
Assessment and Plan:     Problem List Items Addressed This Visit    None     Visit Diagnoses     Need for immunization against influenza        Relevant Orders    influenza vaccine, high-dose, PF 0 7 mL (FLUZONE HIGH-DOSE)          Depression Screening and Follow-up Plan: Patient was screened for depression during today's encounter  They screened negative with a PHQ-2 score of 0  Preventive health issues were discussed with patient, and age appropriate screening tests were ordered as noted in patient's After Visit Summary  Personalized health advice and appropriate referrals for health education or preventive services given if needed, as noted in patient's After Visit Summary  History of Present Illness:     Patient presents for a Medicare Wellness Visit    Mr  Norma got she is here chief complaint is that he is here for a Medicare well visit any acute problems will be addressed     Patient Care Team:  Sera Rueda DO as PCP - General (Family Medicine)     Review of Systems:     Review of Systems   Constitutional: Negative for activity change, appetite change, diaphoresis, fatigue and fever  HENT: Negative  Negative for dental problem, hearing loss and tinnitus  Eyes: Positive for visual disturbance  Patient wears corrective lenses to the best of his knowledge no cataracts no glaucoma and no macular degeneration   Respiratory: Negative for apnea, cough, chest tightness, shortness of breath and wheezing  Cardiovascular: Negative for chest pain, palpitations and leg swelling  Gastrointestinal: Negative for abdominal distention, abdominal pain, anal bleeding, constipation, diarrhea, nausea and vomiting  Endocrine: Negative for cold intolerance, heat intolerance, polydipsia, polyphagia and polyuria  Genitourinary: Negative for difficulty urinating, dysuria, flank pain, hematuria and urgency     Musculoskeletal: Negative for arthralgias, back pain, gait problem, joint swelling and myalgias  Skin: Negative for color change, rash and wound  Allergic/Immunologic: Negative for environmental allergies, food allergies and immunocompromised state  Neurological: Positive for headaches  Negative for dizziness, seizures, syncope, speech difficulty and numbness  Hematological: Negative for adenopathy  Does not bruise/bleed easily  Psychiatric/Behavioral: Negative for agitation, behavioral problems, hallucinations, sleep disturbance and suicidal ideas          Problem List:     Patient Active Problem List   Diagnosis    Obstructive sleep apnea    Gastroesophageal reflux disease without esophagitis    Closed fracture of multiple ribs of right side    Pneumothorax on right    Fall    Right wrist pain      Past Medical and Surgical History:     Past Medical History:   Diagnosis Date    Collapse of right lung     GERD (gastroesophageal reflux disease)      Past Surgical History:   Procedure Laterality Date    APPENDECTOMY      COLONOSCOPY      GUM SURGERY  04/01/2022    Oral extraction - 7 teeth      Family History:     Family History   Problem Relation Age of Onset    Lymphoma Mother     Alcohol abuse Father     Heart attack Father     Lymphoma Sister     Heart attack Brother     Stroke Brother       Social History:     Social History     Socioeconomic History    Marital status: /Civil Union     Spouse name: None    Number of children: None    Years of education: None    Highest education level: None   Occupational History    None   Tobacco Use    Smoking status: Never Smoker    Smokeless tobacco: Never Used   Vaping Use    Vaping Use: Never used   Substance and Sexual Activity    Alcohol use: Not Currently    Drug use: Never    Sexual activity: None   Other Topics Concern    None   Social History Narrative    None     Social Determinants of Health     Financial Resource Strain: Not on file   Food Insecurity: Not on file   Transportation Needs: Not on file Physical Activity: Not on file   Stress: Not on file   Social Connections: Not on file   Intimate Partner Violence: Not on file   Housing Stability: Not on file      Medications and Allergies:     Current Outpatient Medications   Medication Sig Dispense Refill    Aspirin Buf,CaCarb-MgCarb-MgO, 81 MG TABS Take 1 tablet by mouth daily      B Complex Vitamins (B COMPLEX 50 PO) Take 1 capsule by mouth daily      cholecalciferol (VITAMIN D3) 1,000 units tablet Take 2,000 Units by mouth daily      Garlic 10 MG CAPS Take 1 capsule by mouth daily      Multiple Vitamin (multivitamin) capsule Take by mouth      Omega 3-6-9 Fatty Acids (OMEGA 3-6-9 PO) Take by mouth in the morning      omeprazole (PriLOSEC) 40 MG capsule Take 1 capsule (40 mg total) by mouth daily 30 capsule 5    Digestive Enzymes (PAPAYA AND ENZYMES PO) Take 1 tablet by mouth daily (Patient not taking: No sig reported)      pseudoephedrine (SUDAFED) 60 mg tablet Take 60 mg by mouth every 4 (four) hours as needed for congestion (Patient not taking: Reported on 10/4/2022)      Suprep Bowel Prep Kit 17 5-3 13-1 6 GM/177ML SOLN Take 180 mL by mouth once for 1 dose 180 mL 0     No current facility-administered medications for this visit       Allergies   Allergen Reactions    Chocolate - Food Allergy Other (See Comments)     Cold sweats - Milk chocolate only    Ranitidine Edema      Immunizations:     Immunization History   Administered Date(s) Administered    COVID-19 MODERNA VACC 0 5 ML IM 03/26/2021, 04/28/2021    INFLUENZA 11/18/2017, 10/01/2020, 11/20/2021    Influenza, Seasonal Vaccine, Quadrivalent, Adjuvanted,  5e 11/20/2021    Influenza, seasonal, injectable 12/14/2013    Pneumococcal Conjugate 13-Valent 09/26/2020    Pneumococcal Polysaccharide PPV23 04/11/2016, 11/20/2021      Health Maintenance:         Topic Date Due    Hepatitis C Screening  Never done    Colorectal Cancer Screening  07/14/2031         Topic Date Due    COVID-19 Vaccine (3 - Booster for Moderna series) 09/28/2021    Influenza Vaccine (1) 09/01/2022      Medicare Screening Tests and Risk Assessments:     Reina Nicholas is here for his Subsequent Wellness visit  Health Risk Assessment:   Patient rates overall health as very good  Patient feels that their physical health rating is same  Patient is very satisfied with their life  Eyesight was rated as same  Hearing was rated as same  Patient feels that their emotional and mental health rating is slightly better  Patients states they are never, rarely angry  Patient states they are never, rarely unusually tired/fatigued  Pain experienced in the last 7 days has been some  Patient's pain rating has been 3/10  Patient states that he has experienced no weight loss or gain in last 6 months  Depression Screening:   PHQ-2 Score: 0      Fall Risk Screening: In the past year, patient has experienced: no history of falling in past year      Home Safety:  Patient does not have trouble with stairs inside or outside of their home  Patient has working smoke alarms and has working carbon monoxide detector  Home safety hazards include: none  Nutrition:   Current diet is Regular and Limited junk food  Medications:   Patient is currently taking over-the-counter supplements  OTC medications include: see medication list  Patient is able to manage medications  Activities of Daily Living (ADLs)/Instrumental Activities of Daily Living (IADLs):   Walk and transfer into and out of bed and chair?: Yes  Dress and groom yourself?: Yes    Bathe or shower yourself?: Yes    Feed yourself?  Yes  Do your laundry/housekeeping?: Yes  Manage your money, pay your bills and track your expenses?: Yes  Make your own meals?: Yes    Do your own shopping?: Yes    Previous Hospitalizations:   Any hospitalizations or ED visits within the last 12 months?: Yes    How many hospitalizations have you had in the last year?: 1-2    Advance Care Planning:   Living will: Yes    Durable POA for healthcare: Yes    Advanced directive: Yes    Advanced directive counseling given: Yes    Five wishes given: No    Patient declined ACP directive: No    End of Life Decisions reviewed with patient: Yes    Provider agrees with end of life decisions: Yes      Cognitive Screening:   Provider or family/friend/caregiver concerned regarding cognition?: No    PREVENTIVE SCREENINGS      Cardiovascular Screening:    General: Screening Not Indicated and History Lipid Disorder      Diabetes Screening:     General: Screening Current      Colorectal Cancer Screening:     General: Screening Current      Osteoporosis Screening:    General: Screening Not Indicated      Abdominal Aortic Aneurysm (AAA) Screening:    Risk factors include: age between 73-69 yo        Lung Cancer Screening:     General: Screening Not Indicated      Hepatitis C Screening:    General: Screening Not Indicated    Screening, Brief Intervention, and Referral to Treatment (SBIRT)    Screening  Typical number of drinks in a day: 0  Typical number of drinks in a week: 0  Interpretation: Low risk drinking behavior  Single Item Drug Screening:  How often have you used an illegal drug (including marijuana) or a prescription medication for non-medical reasons in the past year? never    Single Item Drug Screen Score: 0  Interpretation: Negative screen for possible drug use disorder    No exam data present     Physical Exam:     /84 (BP Location: Left arm, Patient Position: Sitting, Cuff Size: Standard)   Pulse 64   Temp 98 9 °F (37 2 °C) (Temporal)   Ht 5' 7" (1 702 m)   Wt 69 9 kg (154 lb)   SpO2 98%   BMI 24 12 kg/m²     Physical Exam  Constitutional:       Appearance: He is well-developed  HENT:      Head: Normocephalic and atraumatic        Right Ear: External ear normal       Left Ear: External ear normal       Nose: Nose normal    Eyes:      Conjunctiva/sclera: Conjunctivae normal       Pupils: Pupils are equal, round, and reactive to light  Cardiovascular:      Rate and Rhythm: Normal rate and regular rhythm  Heart sounds: Normal heart sounds  No murmur heard  No friction rub  Pulmonary:      Effort: Pulmonary effort is normal  No respiratory distress  Breath sounds: Normal breath sounds  No wheezing or rales  Chest:      Chest wall: No tenderness  Abdominal:      General: Bowel sounds are normal       Palpations: Abdomen is soft  Musculoskeletal:         General: Normal range of motion  Cervical back: Normal range of motion and neck supple  Skin:     General: Skin is warm and dry  Capillary Refill: Capillary refill takes 2 to 3 seconds  Neurological:      Mental Status: He is alert and oriented to person, place, and time  Psychiatric:         Behavior: Behavior normal          Thought Content:  Thought content normal          Judgment: Judgment normal           Jaleel Fuentes DO

## 2023-04-04 ENCOUNTER — OFFICE VISIT (OUTPATIENT)
Dept: FAMILY MEDICINE CLINIC | Facility: CLINIC | Age: 68
End: 2023-04-04

## 2023-04-04 VITALS
HEART RATE: 70 BPM | DIASTOLIC BLOOD PRESSURE: 80 MMHG | SYSTOLIC BLOOD PRESSURE: 132 MMHG | BODY MASS INDEX: 24.17 KG/M2 | OXYGEN SATURATION: 97 % | HEIGHT: 67 IN | WEIGHT: 154 LBS | TEMPERATURE: 98.1 F

## 2023-04-04 DIAGNOSIS — G47.33 OBSTRUCTIVE SLEEP APNEA: Primary | ICD-10-CM

## 2023-04-04 DIAGNOSIS — S22.41XA CLOSED FRACTURE OF MULTIPLE RIBS OF RIGHT SIDE, INITIAL ENCOUNTER: ICD-10-CM

## 2023-04-04 DIAGNOSIS — E78.2 MIXED HYPERLIPIDEMIA: ICD-10-CM

## 2023-04-04 NOTE — PROGRESS NOTES
Depression Screening and Follow-up Plan: Patient was screened for depression during today's encounter  They screened negative with a PHQ-2 score of 0  Assessment/Plan:    Problem List Items Addressed This Visit        Respiratory    Obstructive sleep apnea - Primary       Musculoskeletal and Integument    Closed fracture of multiple ribs of right side        Diagnoses and all orders for this visit:    Obstructive sleep apnea    Closed fracture of multiple ribs of right side, initial encounter        No problem-specific Assessment & Plan notes found for this encounter  Subjective:      Patient ID: America Haas is a 75 y o  male  Mr Alvin Lynn is here for a follow-up visit and annual physical      The following portions of the patient's history were reviewed and updated as appropriate:   He has a past medical history of Collapse of right lung and GERD (gastroesophageal reflux disease)  ,  does not have any pertinent problems on file  ,   has a past surgical history that includes Appendectomy; Gum surgery (04/01/2022); and Colonoscopy  ,  family history includes Alcohol abuse in his father; Heart attack in his brother and father; Lymphoma in his mother and sister; Stroke in his brother  ,   reports that he has never smoked  He has never used smokeless tobacco  He reports that he does not currently use alcohol  He reports that he does not use drugs  ,  is allergic to chocolate - food allergy and ranitidine     Current Outpatient Medications   Medication Sig Dispense Refill   • Aspirin Buf,CaCarb-MgCarb-MgO, 81 MG TABS Take 1 tablet by mouth daily     • B Complex Vitamins (B COMPLEX 50 PO) Take 1 capsule by mouth daily     • cholecalciferol (VITAMIN D3) 1,000 units tablet Take 2,000 Units by mouth daily     • Digestive Enzymes (PAPAYA AND ENZYMES PO) Take 1 tablet by mouth daily     • Garlic 10 MG CAPS Take 1 capsule by mouth daily     • Multiple Vitamin (multivitamin) capsule Take by mouth     • Omega 3-6-9 Fatty Acids (OMEGA 3-6-9 PO) Take by mouth in the morning     • omeprazole (PriLOSEC) 40 MG capsule Take 1 capsule (40 mg total) by mouth daily 30 capsule 5   • pseudoephedrine (SUDAFED) 60 mg tablet Take 60 mg by mouth every 4 (four) hours as needed for congestion     • Suprep Bowel Prep Kit 17 5-3 13-1 6 GM/177ML SOLN Take 180 mL by mouth once for 1 dose 180 mL 0     No current facility-administered medications for this visit  Review of Systems   Constitutional: Negative for activity change, appetite change, diaphoresis, fatigue and fever  HENT: Positive for dental problem, hearing loss and sinus pressure  Eyes: Positive for visual disturbance  Wears corrective lenses   Respiratory: Negative for apnea, cough, chest tightness, shortness of breath and wheezing  Cardiovascular: Negative for chest pain, palpitations and leg swelling  Gastrointestinal: Negative for abdominal distention, abdominal pain, anal bleeding, constipation, diarrhea, nausea and vomiting  Endocrine: Negative for cold intolerance, heat intolerance, polydipsia, polyphagia and polyuria  Genitourinary: Negative for difficulty urinating, dysuria, flank pain, hematuria and urgency  Musculoskeletal: Positive for arthralgias  Negative for back pain, gait problem, joint swelling and myalgias  Skin: Negative for color change, rash and wound  Allergic/Immunologic: Negative for environmental allergies, food allergies and immunocompromised state  Neurological: Negative for dizziness, seizures, syncope, speech difficulty, numbness and headaches  Hematological: Negative for adenopathy  Does not bruise/bleed easily  Psychiatric/Behavioral: Negative for agitation, behavioral problems, hallucinations, sleep disturbance and suicidal ideas           Objective:  Vitals:    04/04/23 1404   BP: 132/80   BP Location: Left arm   Patient Position: Sitting   Cuff Size: Standard   Pulse: 70   Temp: 98 1 °F (36 7 °C)   TempSrc: Temporal "  SpO2: 97%   Weight: 69 9 kg (154 lb)   Height: 5' 7\" (1 702 m)     Body mass index is 24 12 kg/m²  Physical Exam  Constitutional:       General: He is not in acute distress  Appearance: He is well-developed  He is not diaphoretic  HENT:      Head: Normocephalic  Right Ear: External ear normal       Left Ear: External ear normal       Nose: Nose normal    Eyes:      General: No scleral icterus  Right eye: No discharge  Left eye: No discharge  Conjunctiva/sclera: Conjunctivae normal       Pupils: Pupils are equal, round, and reactive to light  Neck:      Thyroid: No thyromegaly  Trachea: No tracheal deviation  Cardiovascular:      Rate and Rhythm: Normal rate and regular rhythm  Heart sounds: Normal heart sounds  No murmur heard  No friction rub  No gallop  Pulmonary:      Effort: Pulmonary effort is normal  No respiratory distress  Breath sounds: Normal breath sounds  No wheezing  Abdominal:      General: Bowel sounds are normal       Palpations: Abdomen is soft  There is no mass  Tenderness: There is no abdominal tenderness  There is no guarding  Musculoskeletal:         General: No deformity  Cervical back: Normal range of motion  Lymphadenopathy:      Cervical: No cervical adenopathy  Skin:     General: Skin is warm and dry  Findings: No erythema or rash  Neurological:      Mental Status: He is alert and oriented to person, place, and time  Cranial Nerves: No cranial nerve deficit  Psychiatric:         Thought Content:  Thought content normal          "

## 2023-05-02 DIAGNOSIS — K22.2 PEPTIC STRICTURE OF ESOPHAGUS: ICD-10-CM

## 2023-05-02 DIAGNOSIS — K21.00 GASTROESOPHAGEAL REFLUX DISEASE WITH ESOPHAGITIS WITHOUT HEMORRHAGE: ICD-10-CM

## 2023-05-02 RX ORDER — OMEPRAZOLE 40 MG/1
40 CAPSULE, DELAYED RELEASE ORAL DAILY
Qty: 30 CAPSULE | Refills: 5 | Status: SHIPPED | OUTPATIENT
Start: 2023-05-02

## 2023-10-02 ENCOUNTER — RA CDI HCC (OUTPATIENT)
Dept: OTHER | Facility: HOSPITAL | Age: 68
End: 2023-10-02

## 2023-10-02 NOTE — PROGRESS NOTES
[address] coding opportunities       Chart reviewed, no opportunity found: [address] Review     Patients Insurance     Medicare Insurance: [employer]

## 2023-10-07 ENCOUNTER — RA CDI HCC (OUTPATIENT)
Dept: OTHER | Facility: HOSPITAL | Age: 68
End: 2023-10-07

## 2023-10-08 NOTE — PROGRESS NOTES
[address] [facility] coding opportunities       Chart reviewed, no opportunity found: CHART REVIEWED, [facility]     Patients Insurance     Medicare Insurance: [employer]

## 2023-10-10 ENCOUNTER — OFFICE VISIT (OUTPATIENT)
Dept: FAMILY MEDICINE CLINIC | Facility: CLINIC | Age: 68
End: 2023-10-10
Payer: COMMERCIAL

## 2023-10-10 VITALS
HEIGHT: 67 IN | BODY MASS INDEX: 23.86 KG/M2 | OXYGEN SATURATION: 95 % | TEMPERATURE: 97.3 F | WEIGHT: 152 LBS | SYSTOLIC BLOOD PRESSURE: 128 MMHG | HEART RATE: 69 BPM | DIASTOLIC BLOOD PRESSURE: 86 MMHG

## 2023-10-10 DIAGNOSIS — Z00.00 MEDICARE ANNUAL WELLNESS VISIT, SUBSEQUENT: ICD-10-CM

## 2023-10-10 DIAGNOSIS — K22.2 PEPTIC STRICTURE OF ESOPHAGUS: ICD-10-CM

## 2023-10-10 DIAGNOSIS — Z23 NEED FOR IMMUNIZATION AGAINST INFLUENZA: Primary | ICD-10-CM

## 2023-10-10 DIAGNOSIS — K21.00 GASTROESOPHAGEAL REFLUX DISEASE WITH ESOPHAGITIS WITHOUT HEMORRHAGE: ICD-10-CM

## 2023-10-10 PROCEDURE — 90662 IIV NO PRSV INCREASED AG IM: CPT | Performed by: FAMILY MEDICINE

## 2023-10-10 PROCEDURE — G0439 PPPS, SUBSEQ VISIT: HCPCS | Performed by: FAMILY MEDICINE

## 2023-10-10 PROCEDURE — G0008 ADMIN INFLUENZA VIRUS VAC: HCPCS | Performed by: FAMILY MEDICINE

## 2023-10-10 RX ORDER — OMEPRAZOLE 40 MG/1
40 CAPSULE, DELAYED RELEASE ORAL DAILY
Qty: 30 CAPSULE | Refills: 5 | Status: SHIPPED | OUTPATIENT
Start: 2023-10-10 | End: 2023-10-10 | Stop reason: SDUPTHER

## 2023-10-10 RX ORDER — OMEPRAZOLE 40 MG/1
40 CAPSULE, DELAYED RELEASE ORAL DAILY
Qty: 30 CAPSULE | Refills: 5 | Status: SHIPPED | OUTPATIENT
Start: 2023-10-10

## 2023-10-10 RX ORDER — OMEPRAZOLE 40 MG/1
40 CAPSULE, DELAYED RELEASE ORAL DAILY
Qty: 30 CAPSULE | Refills: 5 | Status: CANCELLED | OUTPATIENT
Start: 2023-10-10

## 2023-10-10 NOTE — PROGRESS NOTES
Assessment and Plan:     Problem List Items Addressed This Visit    None  Visit Diagnoses     Need for immunization against influenza    -  Primary    Relevant Orders    influenza vaccine, high-dose, PF 0.7 mL (FLUZONE HIGH-DOSE)    Gastroesophageal reflux disease with esophagitis without hemorrhage        Peptic stricture of esophagus              Depression Screening and Follow-up Plan: Patient was screened for depression during today's encounter. They screened negative with a PHQ-2 score of 0. Preventive health issues were discussed with patient, and age appropriate screening tests were ordered as noted in patient's After Visit Summary. Personalized health advice and appropriate referrals for health education or preventive services given if needed, as noted in patient's After Visit Summary. History of Present Illness:     Patient presents for a Medicare Wellness Visit    Patient is here for a Medicare subsequent well visit any acute problems will also be addressed     Patient Care Team:  Estefania Tabor DO as PCP - General (Family Medicine)     Review of Systems:     Review of Systems   Constitutional: Negative for activity change, appetite change, diaphoresis, fatigue and fever. HENT: Positive for ear pain and hearing loss. Eustachian tube dysfunction right ear eardrum appears normal   Eyes: Positive for visual disturbance. Wears glasses he has had cataracts removed   Respiratory: Negative for apnea, cough, chest tightness, shortness of breath and wheezing. Cardiovascular: Negative for chest pain, palpitations and leg swelling. Gastrointestinal: Negative for abdominal distention, abdominal pain, anal bleeding, constipation, diarrhea, nausea and vomiting. Endocrine: Negative for cold intolerance, heat intolerance, polydipsia, polyphagia and polyuria. Genitourinary: Negative for difficulty urinating, dysuria, flank pain, hematuria and urgency.    Musculoskeletal: Positive for arthralgias. Negative for back pain, gait problem, joint swelling and myalgias. Arthralgia right wrist at site of previous fracture   Skin: Negative for color change, rash and wound. Allergic/Immunologic: Negative for environmental allergies, food allergies and immunocompromised state. Neurological: Negative for dizziness, seizures, syncope, speech difficulty, numbness and headaches. Hematological: Negative for adenopathy. Does not bruise/bleed easily. Psychiatric/Behavioral: Negative for agitation, behavioral problems, hallucinations, sleep disturbance and suicidal ideas.         Problem List:     Patient Active Problem List   Diagnosis   • Obstructive sleep apnea   • Gastroesophageal reflux disease without esophagitis   • Closed fracture of multiple ribs of right side   • Pneumothorax on right   • Fall   • Right wrist pain      Past Medical and Surgical History:     Past Medical History:   Diagnosis Date   • Collapse of right lung    • GERD (gastroesophageal reflux disease)      Past Surgical History:   Procedure Laterality Date   • APPENDECTOMY     • COLONOSCOPY     • GUM SURGERY  04/01/2022    Oral extraction - 7 teeth      Family History:     Family History   Problem Relation Age of Onset   • Lymphoma Mother    • Alcohol abuse Father    • Heart attack Father    • Lymphoma Sister    • Heart attack Brother    • Stroke Brother       Social History:     Social History     Socioeconomic History   • Marital status: /Civil Union     Spouse name: None   • Number of children: None   • Years of education: None   • Highest education level: None   Occupational History   • None   Tobacco Use   • Smoking status: Never   • Smokeless tobacco: Never   Vaping Use   • Vaping Use: Never used   Substance and Sexual Activity   • Alcohol use: Not Currently   • Drug use: Never   • Sexual activity: None   Other Topics Concern   • None   Social History Narrative   • None     Social Determinants of Health Financial Resource Strain: Low Risk  (10/4/2022)    Overall Financial Resource Strain (CARDIA)    • Difficulty of Paying Living Expenses: Not hard at all   Food Insecurity: Not on file   Transportation Needs: No Transportation Needs (10/4/2022)    PRAPARE - Transportation    • Lack of Transportation (Medical): No    • Lack of Transportation (Non-Medical): No   Physical Activity: Not on file   Stress: Not on file   Social Connections: Not on file   Intimate Partner Violence: Not on file   Housing Stability: Not on file      Medications and Allergies:     Current Outpatient Medications   Medication Sig Dispense Refill   • Aspirin Buf,CaCarb-MgCarb-MgO, 81 MG TABS Take 1 tablet by mouth daily     • B Complex Vitamins (B COMPLEX 50 PO) Take 1 capsule by mouth daily     • cholecalciferol (VITAMIN D3) 1,000 units tablet Take 2,000 Units by mouth daily     • Digestive Enzymes (PAPAYA AND ENZYMES PO) Take 1 tablet by mouth daily     • Garlic 10 MG CAPS Take 1 capsule by mouth daily     • Multiple Vitamin (multivitamin) capsule Take by mouth     • Omega 3-6-9 Fatty Acids (OMEGA 3-6-9 PO) Take by mouth in the morning     • omeprazole (PriLOSEC) 40 MG capsule Take 1 capsule (40 mg total) by mouth daily 30 capsule 5   • pseudoephedrine (SUDAFED) 60 mg tablet Take 60 mg by mouth every 4 (four) hours as needed for congestion     • Suprep Bowel Prep Kit 17.5-3.13-1.6 GM/177ML SOLN Take 180 mL by mouth once for 1 dose 180 mL 0     No current facility-administered medications for this visit.      Allergies   Allergen Reactions   • Chocolate - Food Allergy Other (See Comments)     Cold sweats - Milk chocolate only   • Ranitidine Edema      Immunizations:     Immunization History   Administered Date(s) Administered   • COVID-19 MODERNA VACC 0.5 ML IM 03/26/2021, 04/28/2021   • INFLUENZA 11/18/2017, 10/01/2020, 11/20/2021   • Influenza, Seasonal Vaccine, Quadrivalent, Adjuvanted, .5e 11/20/2021   • Influenza, high dose seasonal 0.7 mL 10/04/2022   • Influenza, seasonal, injectable 12/14/2013   • Pneumococcal Conjugate 13-Valent 09/26/2020   • Pneumococcal Polysaccharide PPV23 04/11/2016, 11/20/2021      Health Maintenance:         Topic Date Due   • Hepatitis C Screening  Never done   • Colorectal Cancer Screening  07/14/2031         Topic Date Due   • COVID-19 Vaccine (3 - Moderna series) 06/23/2021   • Influenza Vaccine (1) 09/01/2023      Medicare Screening Tests and Risk Assessments:     Payam Casey is here for his Subsequent Wellness visit. Health Risk Assessment:   Patient rates overall health as very good. Patient feels that their physical health rating is same. Patient is very satisfied with their life. Eyesight was rated as slightly worse. Hearing was rated as slightly worse. Patient feels that their emotional and mental health rating is slightly better. Patients states they are never, rarely angry. Patient states they are sometimes unusually tired/fatigued. Pain experienced in the last 7 days has been some. Patient's pain rating has been 3/10. Patient states that he has experienced no weight loss or gain in last 6 months. Depression Screening:   PHQ-2 Score: 0      Fall Risk Screening: In the past year, patient has experienced: no history of falling in past year      Home Safety:  Patient does not have trouble with stairs inside or outside of their home. Patient has working smoke alarms and has working carbon monoxide detector. Home safety hazards include: none. Nutrition:   Current diet is Regular and Limited junk food. Medications:   Patient is currently taking over-the-counter supplements. OTC medications include: see medication list. Patient is able to manage medications. Activities of Daily Living (ADLs)/Instrumental Activities of Daily Living (IADLs):   Walk and transfer into and out of bed and chair?: Yes  Dress and groom yourself?: Yes    Bathe or shower yourself?: Yes    Feed yourself?  Yes  Do your laundry/housekeeping?: Yes  Manage your money, pay your bills and track your expenses?: Yes  Make your own meals?: Yes    Do your own shopping?: Yes    Durable Medical Equipment Suppliers  CPAP - DME Unknown    Previous Hospitalizations:   Any hospitalizations or ED visits within the last 12 months?: No      Advance Care Planning:   Living will: No    Durable POA for healthcare: Yes    Advanced directive: No    Advanced directive counseling given: Yes    ACP document given: Yes    Patient declined ACP directive: Yes    End of Life Decisions reviewed with patient: Yes    Provider agrees with end of life decisions: Yes      Cognitive Screening:   Provider or family/friend/caregiver concerned regarding cognition?: No    PREVENTIVE SCREENINGS      Cardiovascular Screening:    General: Screening Not Indicated and History Lipid Disorder      Diabetes Screening:     General: Screening Current      Colorectal Cancer Screening:     General: Screening Current      Prostate Cancer Screening:    General: Screening Not Indicated      Osteoporosis Screening:    General: Screening Not Indicated      Abdominal Aortic Aneurysm (AAA) Screening:    Risk factors include: age between 70-77 yo        Lung Cancer Screening:     General: Screening Not Indicated      Hepatitis C Screening:    General: Screening Not Indicated    Screening, Brief Intervention, and Referral to Treatment (SBIRT)    Screening  Typical number of drinks in a day: 0  Typical number of drinks in a week: 0  Interpretation: Low risk drinking behavior. Single Item Drug Screening:  How often have you used an illegal drug (including marijuana) or a prescription medication for non-medical reasons in the past year? never    Single Item Drug Screen Score: 0  Interpretation: Negative screen for possible drug use disorder    No results found.      Physical Exam:     /86 (BP Location: Left arm, Patient Position: Sitting, Cuff Size: Standard)   Pulse 69   Temp (!) 97.3 °F (36.3 °C) (Temporal)   Ht 5' 7" (1.702 m)   Wt 68.9 kg (152 lb)   SpO2 95%   BMI 23.81 kg/m²     Physical Exam  Constitutional:       Appearance: He is well-developed. HENT:      Head: Normocephalic and atraumatic. Right Ear: External ear normal.      Left Ear: External ear normal.      Nose: Nose normal.   Eyes:      Conjunctiva/sclera: Conjunctivae normal.      Pupils: Pupils are equal, round, and reactive to light. Cardiovascular:      Rate and Rhythm: Normal rate and regular rhythm. Heart sounds: Normal heart sounds. No murmur heard. No friction rub. Pulmonary:      Effort: Pulmonary effort is normal. No respiratory distress. Breath sounds: Normal breath sounds. No wheezing or rales. Chest:      Chest wall: No tenderness. Abdominal:      General: Bowel sounds are normal.      Palpations: Abdomen is soft. Musculoskeletal:         General: Normal range of motion. Cervical back: Normal range of motion and neck supple. Skin:     General: Skin is warm and dry. Capillary Refill: Capillary refill takes 2 to 3 seconds. Neurological:      Mental Status: He is alert and oriented to person, place, and time. Psychiatric:         Behavior: Behavior normal.         Thought Content:  Thought content normal.         Judgment: Judgment normal.          Mindy Zhang DO

## 2023-10-10 NOTE — PATIENT INSTRUCTIONS
Medicare Preventive Visit Patient Instructions  Thank you for completing your Welcome to Medicare Visit or Medicare Annual Wellness Visit today. Your next wellness visit will be due in one year (10/10/2024). The screening/preventive services that you may require over the next 5-10 years are detailed below. Some tests may not apply to you based off risk factors and/or age. Screening tests ordered at today's visit but not completed yet may show as past due. Also, please note that scanned in results may not display below. Preventive Screenings:  Service Recommendations Previous Testing/Comments   Colorectal Cancer Screening  · Colonoscopy    · Fecal Occult Blood Test (FOBT)/Fecal Immunochemical Test (FIT)  · Fecal DNA/Cologuard Test  · Flexible Sigmoidoscopy Age: 43-73 years old   Colonoscopy: every 10 years (May be performed more frequently if at higher risk)  OR  FOBT/FIT: every 1 year  OR  Cologuard: every 3 years  OR  Sigmoidoscopy: every 5 years  Screening may be recommended earlier than age 39 if at higher risk for colorectal cancer. Also, an individualized decision between you and your healthcare provider will decide whether screening between the ages of 77-80 would be appropriate.  Colonoscopy: 07/16/2021  FOBT/FIT: Not on file  Cologuard: Not on file  Sigmoidoscopy: Not on file    Screening Current     Prostate Cancer Screening Individualized decision between patient and health care provider in men between ages of 53-66   Medicare will cover every 12 months beginning on the day after your 50th birthday PSA: No results in last 5 years           Hepatitis C Screening Once for adults born between 80 and 1965  More frequently in patients at high risk for Hepatitis C Hep C Antibody: Not on file        Diabetes Screening 1-2 times per year if you're at risk for diabetes or have pre-diabetes Fasting glucose: 82 mg/dL (4/13/2023)  A1C: No results in last 5 years (No results in last 5 years)  Screening Current Cholesterol Screening Once every 5 years if you don't have a lipid disorder. May order more often based on risk factors. Lipid panel: 04/13/2023  Screening Not Indicated  History Lipid Disorder      Other Preventive Screenings Covered by Medicare:  1. Abdominal Aortic Aneurysm (AAA) Screening: covered once if your at risk. You're considered to be at risk if you have a family history of AAA or a male between the age of 70-76 who smoking at least 100 cigarettes in your lifetime. 2. Lung Cancer Screening: covers low dose CT scan once per year if you meet all of the following conditions: (1) Age 48-67; (2) No signs or symptoms of lung cancer; (3) Current smoker or have quit smoking within the last 15 years; (4) You have a tobacco smoking history of at least 20 pack years (packs per day x number of years you smoked); (5) You get a written order from a healthcare provider. 3. Glaucoma Screening: covered annually if you're considered high risk: (1) You have diabetes OR (2) Family history of glaucoma OR (3)  aged 48 and older OR (3)  American aged 72 and older  3. Osteoporosis Screening: covered every 2 years if you meet one of the following conditions: (1) Have a vertebral abnormality; (2) On glucocorticoid therapy for more than 3 months; (3) Have primary hyperparathyroidism; (4) On osteoporosis medications and need to assess response to drug therapy. 5. HIV Screening: covered annually if you're between the age of 14-79. Also covered annually if you are younger than 13 and older than 72 with risk factors for HIV infection. For pregnant patients, it is covered up to 3 times per pregnancy.     Immunizations:  Immunization Recommendations   Influenza Vaccine Annual influenza vaccination during flu season is recommended for all persons aged >= 6 months who do not have contraindications   Pneumococcal Vaccine   * Pneumococcal conjugate vaccine = PCV13 (Prevnar 13), PCV15 (Vaxneuvance), PCV20 (Prevnar 20)  * Pneumococcal polysaccharide vaccine = PPSV23 (Pneumovax) Adults 47-06 yo with certain risk factors or if 65+ yo  · If never received any pneumonia vaccine: recommend Prevnar 20 (PCV20)  · Give PCV20 if previously received 1 dose of PCV13 or PPSV23   Hepatitis B Vaccine 3 dose series if at intermediate or high risk (ex: diabetes, end stage renal disease, liver disease)   Respiratory syncytial virus (RSV) Vaccine - COVERED BY MEDICARE PART D  * RSVPreF3 (Arexvy) CDC recommends that adults 61years of age and older may receive a single dose of RSV vaccine using shared clinical decision-making (SCDM)   Tetanus (Td) Vaccine - COST NOT COVERED BY MEDICARE PART B Following completion of primary series, a booster dose should be given every 10 years to maintain immunity against tetanus. Td may also be given as tetanus wound prophylaxis. Tdap Vaccine - COST NOT COVERED BY MEDICARE PART B Recommended at least once for all adults. For pregnant patients, recommended with each pregnancy. Shingles Vaccine (Shingrix) - COST NOT COVERED BY MEDICARE PART B  2 shot series recommended in those 19 years and older who have or will have weakened immune systems or those 50 years and older     Health Maintenance Due:      Topic Date Due   • Hepatitis C Screening  Never done   • Colorectal Cancer Screening  07/14/2031     Immunizations Due:      Topic Date Due   • COVID-19 Vaccine (3 - Moderna series) 06/23/2021   • Influenza Vaccine (1) 09/01/2023     Advance Directives   What are advance directives? Advance directives are legal documents that state your wishes and plans for medical care. These plans are made ahead of time in case you lose your ability to make decisions for yourself. Advance directives can apply to any medical decision, such as the treatments you want, and if you want to donate organs. What are the types of advance directives?   There are many types of advance directives, and each state has rules about how to use them. You may choose a combination of any of the following:  · Living will: This is a written record of the treatment you want. You can also choose which treatments you do not want, which to limit, and which to stop at a certain time. This includes surgery, medicine, IV fluid, and tube feedings. · Durable power of  for healthcare [facility]): This is a written record that states who you want to make healthcare choices for you when you are unable to make them for yourself. This person, called a proxy, is usually a family member or a friend. You may choose more than 1 proxy. · Do not resuscitate (DNR) order:  A DNR order is used in case your heart stops beating or you stop breathing. It is a request not to have certain forms of treatment, such as CPR. A DNR order may be included in other types of advance directives. · Medical directive: This covers the care that you want if you are in a coma, near death, or unable to make decisions for yourself. You can list the treatments you want for each condition. Treatment may include pain medicine, surgery, blood transfusions, dialysis, IV or tube feedings, and a ventilator (breathing machine). · Values history: This document has questions about your views, beliefs, and how you feel and think about life. This information can help others choose the care that you would choose. Why are advance directives important? An advance directive helps you control your care. Although spoken wishes may be used, it is better to have your wishes written down. Spoken wishes can be misunderstood, or not followed. Treatments may be given even if you do not want them. An advance directive may make it easier for your family to make difficult choices about your care. © Copyright [employer] 2018 Information is for End User's use only and may not be sold, redistributed or otherwise used for commercial purposes.  All illustrations and images included in CareNotes® are the copyrighted property of [email or web address] or [address] [place] [address]

## 2024-02-26 ENCOUNTER — OFFICE VISIT (OUTPATIENT)
Dept: FAMILY MEDICINE CLINIC | Facility: CLINIC | Age: 69
End: 2024-02-26
Payer: COMMERCIAL

## 2024-02-26 VITALS
WEIGHT: 154 LBS | SYSTOLIC BLOOD PRESSURE: 128 MMHG | DIASTOLIC BLOOD PRESSURE: 76 MMHG | OXYGEN SATURATION: 98 % | BODY MASS INDEX: 24.17 KG/M2 | HEIGHT: 67 IN | HEART RATE: 86 BPM | TEMPERATURE: 97.5 F

## 2024-02-26 DIAGNOSIS — E78.2 MIXED HYPERLIPIDEMIA: ICD-10-CM

## 2024-02-26 DIAGNOSIS — R79.1 ABNORMAL COAGULATION PROFILE: ICD-10-CM

## 2024-02-26 DIAGNOSIS — Z01.818 PREOPERATIVE TESTING: ICD-10-CM

## 2024-02-26 DIAGNOSIS — R01.1 HEART MURMUR: ICD-10-CM

## 2024-02-26 DIAGNOSIS — N40.0 HYPERTROPHY OF PROSTATE: ICD-10-CM

## 2024-02-26 DIAGNOSIS — K40.90 RIGHT INGUINAL HERNIA: Primary | ICD-10-CM

## 2024-02-26 PROCEDURE — 99213 OFFICE O/P EST LOW 20 MIN: CPT | Performed by: FAMILY MEDICINE

## 2024-02-26 RX ORDER — IBUPROFEN 200 MG
400 TABLET ORAL EVERY 6 HOURS PRN
COMMUNITY

## 2024-02-26 NOTE — PROGRESS NOTES
Assessment/Plan:    Problem List Items Addressed This Visit    None  Visit Diagnoses       Right inguinal hernia    -  Primary    Hypertrophy of prostate        Heart murmur        Mixed hyperlipidemia        Preoperative testing                 Diagnoses and all orders for this visit:    Right inguinal hernia    Hypertrophy of prostate    Heart murmur    Mixed hyperlipidemia    Preoperative testing    Other orders  -     ibuprofen (Advil) 200 mg tablet; Take 400 mg by mouth every 6 (six) hours as needed for headaches        No problem-specific Assessment & Plan notes found for this encounter.        Subjective:      Patient ID: Andrea Houston is a 68 y.o. male.    Patient here for follow-up of visit back in 2021 we had set him up for possible correction of a right inguinal hernia he never followed through on that and now it is bothering him more and more to the point where it is painful almost all the time although in varying degrees of severity he would like to get this fixed at this time        The following portions of the patient's history were reviewed and updated as appropriate:   He has a past medical history of Collapse of right lung and GERD (gastroesophageal reflux disease).,  does not have any pertinent problems on file.,   has a past surgical history that includes Appendectomy; Gum surgery (04/01/2022); and Colonoscopy.,  family history includes Alcohol abuse in his father; Heart attack in his brother and father; Lymphoma in his mother and sister; Stroke in his brother.,   reports that he has never smoked. He has never used smokeless tobacco. He reports that he does not currently use alcohol. He reports that he does not use drugs.,  is allergic to chocolate - food allergy and ranitidine..  Current Outpatient Medications   Medication Sig Dispense Refill    Aspirin Buf,CaCarb-MgCarb-MgO, 81 MG TABS Take 1 tablet by mouth daily      B Complex Vitamins (B COMPLEX 50 PO) Take 1 capsule by mouth daily       cholecalciferol (VITAMIN D3) 1,000 units tablet Take 2,000 Units by mouth daily      Digestive Enzymes (PAPAYA AND ENZYMES PO) Take 1 tablet by mouth daily      Garlic 10 MG CAPS Take 1 capsule by mouth daily      ibuprofen (Advil) 200 mg tablet Take 400 mg by mouth every 6 (six) hours as needed for headaches      Multiple Vitamin (multivitamin) capsule Take by mouth      Omega 3-6-9 Fatty Acids (OMEGA 3-6-9 PO) Take 1 capsule by mouth 2 (two) times a day      omeprazole (PriLOSEC) 40 MG capsule Take 1 capsule (40 mg total) by mouth daily 30 capsule 5    Suprep Bowel Prep Kit 17.5-3.13-1.6 GM/177ML SOLN Take 180 mL by mouth once for 1 dose 180 mL 0     No current facility-administered medications for this visit.       Review of Systems   Constitutional:  Negative for activity change, appetite change, diaphoresis, fatigue and fever.   HENT:  Positive for hearing loss. Negative for congestion.    Eyes: Negative.  Negative for visual disturbance.   Respiratory:  Negative for apnea, cough, chest tightness, shortness of breath and wheezing.    Cardiovascular:  Negative for chest pain, palpitations and leg swelling.   Gastrointestinal:  Negative for abdominal distention, abdominal pain, anal bleeding, constipation, diarrhea, nausea and vomiting.        Right direct inguinal hernia   Endocrine: Negative for cold intolerance, heat intolerance, polydipsia, polyphagia and polyuria.   Genitourinary:  Negative for difficulty urinating, dysuria, flank pain, hematuria and urgency.   Musculoskeletal:  Negative for arthralgias, back pain, gait problem, joint swelling and myalgias.   Skin:  Negative for color change, rash and wound.   Allergic/Immunologic: Negative for environmental allergies, food allergies and immunocompromised state.   Neurological:  Negative for dizziness, seizures, syncope, speech difficulty, numbness and headaches.   Hematological:  Negative for adenopathy. Does not bruise/bleed easily.  "  Psychiatric/Behavioral:  Negative for agitation, behavioral problems, hallucinations, sleep disturbance and suicidal ideas.          Objective:  Vitals:    02/26/24 1417   BP: 128/76   BP Location: Left arm   Patient Position: Sitting   Cuff Size: Standard   Pulse: 86   Temp: 97.5 °F (36.4 °C)   TempSrc: Temporal   SpO2: 98%   Weight: 69.9 kg (154 lb)   Height: 5' 7\" (1.702 m)     Body mass index is 24.12 kg/m².     Physical Exam  Constitutional:       General: He is not in acute distress.     Appearance: He is well-developed. He is not diaphoretic.   HENT:      Head: Normocephalic.      Right Ear: External ear normal.      Left Ear: External ear normal.      Nose: Nose normal.   Eyes:      General: No scleral icterus.        Right eye: No discharge.         Left eye: No discharge.      Conjunctiva/sclera: Conjunctivae normal.      Pupils: Pupils are equal, round, and reactive to light.   Neck:      Thyroid: No thyromegaly.      Trachea: No tracheal deviation.   Cardiovascular:      Rate and Rhythm: Normal rate and regular rhythm.      Heart sounds: Normal heart sounds. No murmur heard.     No friction rub. No gallop.   Pulmonary:      Effort: Pulmonary effort is normal. No respiratory distress.      Breath sounds: Normal breath sounds. No wheezing.   Abdominal:      General: Bowel sounds are normal.      Palpations: Abdomen is soft. There is no mass.      Tenderness: There is abdominal tenderness. There is guarding.   Musculoskeletal:         General: No deformity.      Cervical back: Normal range of motion.   Lymphadenopathy:      Cervical: No cervical adenopathy.   Skin:     General: Skin is warm and dry.      Findings: No erythema or rash.   Neurological:      Mental Status: He is alert and oriented to person, place, and time.      Cranial Nerves: No cranial nerve deficit.   Psychiatric:         Thought Content: Thought content normal.         "

## 2024-02-27 ENCOUNTER — LAB (OUTPATIENT)
Dept: LAB | Facility: HOSPITAL | Age: 69
End: 2024-02-27
Payer: COMMERCIAL

## 2024-02-27 DIAGNOSIS — Z01.818 PREOPERATIVE TESTING: ICD-10-CM

## 2024-02-27 LAB
ALBUMIN SERPL BCP-MCNC: 4.4 G/DL (ref 3.5–5)
ALP SERPL-CCNC: 68 U/L (ref 34–104)
ALT SERPL W P-5'-P-CCNC: 27 U/L (ref 7–52)
ANION GAP SERPL CALCULATED.3IONS-SCNC: 7 MMOL/L
AST SERPL W P-5'-P-CCNC: 27 U/L (ref 13–39)
BASOPHILS # BLD AUTO: 0.07 THOUSANDS/ÂΜL (ref 0–0.1)
BASOPHILS NFR BLD AUTO: 2 % (ref 0–1)
BILIRUB SERPL-MCNC: 0.85 MG/DL (ref 0.2–1)
BUN SERPL-MCNC: 21 MG/DL (ref 5–25)
CALCIUM SERPL-MCNC: 9.4 MG/DL (ref 8.4–10.2)
CHLORIDE SERPL-SCNC: 104 MMOL/L (ref 96–108)
CHOLEST SERPL-MCNC: 174 MG/DL
CO2 SERPL-SCNC: 29 MMOL/L (ref 21–32)
CREAT SERPL-MCNC: 0.74 MG/DL (ref 0.6–1.3)
EOSINOPHIL # BLD AUTO: 0.16 THOUSAND/ÂΜL (ref 0–0.61)
EOSINOPHIL NFR BLD AUTO: 3 % (ref 0–6)
ERYTHROCYTE [DISTWIDTH] IN BLOOD BY AUTOMATED COUNT: 12.3 % (ref 11.6–15.1)
GFR SERPL CREATININE-BSD FRML MDRD: 94 ML/MIN/1.73SQ M
GLUCOSE P FAST SERPL-MCNC: 106 MG/DL (ref 65–99)
HCT VFR BLD AUTO: 44.9 % (ref 36.5–49.3)
HDLC SERPL-MCNC: 68 MG/DL
HGB BLD-MCNC: 15.2 G/DL (ref 12–17)
IMM GRANULOCYTES # BLD AUTO: 0 THOUSAND/UL (ref 0–0.2)
IMM GRANULOCYTES NFR BLD AUTO: 0 % (ref 0–2)
INR PPP: 1.07 (ref 0.84–1.19)
LDLC SERPL CALC-MCNC: 95 MG/DL (ref 0–100)
LYMPHOCYTES # BLD AUTO: 1.82 THOUSANDS/ÂΜL (ref 0.6–4.47)
LYMPHOCYTES NFR BLD AUTO: 39 % (ref 14–44)
MCH RBC QN AUTO: 29.9 PG (ref 26.8–34.3)
MCHC RBC AUTO-ENTMCNC: 33.9 G/DL (ref 31.4–37.4)
MCV RBC AUTO: 88 FL (ref 82–98)
MONOCYTES # BLD AUTO: 0.41 THOUSAND/ÂΜL (ref 0.17–1.22)
MONOCYTES NFR BLD AUTO: 9 % (ref 4–12)
NEUTROPHILS # BLD AUTO: 2.21 THOUSANDS/ÂΜL (ref 1.85–7.62)
NEUTS SEG NFR BLD AUTO: 47 % (ref 43–75)
NONHDLC SERPL-MCNC: 106 MG/DL
NRBC BLD AUTO-RTO: 0 /100 WBCS
PLATELET # BLD AUTO: 231 THOUSANDS/UL (ref 149–390)
PMV BLD AUTO: 9.8 FL (ref 8.9–12.7)
POTASSIUM SERPL-SCNC: 4.2 MMOL/L (ref 3.5–5.3)
PROT SERPL-MCNC: 7 G/DL (ref 6.4–8.4)
PROTHROMBIN TIME: 13.8 SECONDS (ref 11.6–14.5)
RBC # BLD AUTO: 5.08 MILLION/UL (ref 3.88–5.62)
SODIUM SERPL-SCNC: 140 MMOL/L (ref 135–147)
TRIGL SERPL-MCNC: 53 MG/DL
WBC # BLD AUTO: 4.67 THOUSAND/UL (ref 4.31–10.16)

## 2024-02-27 PROCEDURE — 85025 COMPLETE CBC W/AUTO DIFF WBC: CPT

## 2024-02-27 PROCEDURE — 85610 PROTHROMBIN TIME: CPT | Performed by: FAMILY MEDICINE

## 2024-02-27 PROCEDURE — 80061 LIPID PANEL: CPT | Performed by: FAMILY MEDICINE

## 2024-02-27 PROCEDURE — 36415 COLL VENOUS BLD VENIPUNCTURE: CPT | Performed by: FAMILY MEDICINE

## 2024-02-27 PROCEDURE — 80053 COMPREHEN METABOLIC PANEL: CPT | Performed by: FAMILY MEDICINE

## 2024-03-07 ENCOUNTER — CONSULT (OUTPATIENT)
Dept: SURGERY | Facility: CLINIC | Age: 69
End: 2024-03-07
Payer: COMMERCIAL

## 2024-03-07 ENCOUNTER — HOSPITAL ENCOUNTER (OUTPATIENT)
Dept: RADIOLOGY | Facility: HOSPITAL | Age: 69
Discharge: HOME | End: 2024-03-07
Payer: COMMERCIAL

## 2024-03-07 VITALS
OXYGEN SATURATION: 96 % | HEIGHT: 67 IN | SYSTOLIC BLOOD PRESSURE: 122 MMHG | HEART RATE: 74 BPM | DIASTOLIC BLOOD PRESSURE: 76 MMHG | TEMPERATURE: 98.5 F | BODY MASS INDEX: 24.17 KG/M2 | WEIGHT: 154 LBS

## 2024-03-07 DIAGNOSIS — K40.90 RIGHT INGUINAL HERNIA: ICD-10-CM

## 2024-03-07 DIAGNOSIS — R01.1 HEART MURMUR: ICD-10-CM

## 2024-03-07 PROCEDURE — 99203 OFFICE O/P NEW LOW 30 MIN: CPT | Performed by: SURGERY

## 2024-03-07 PROCEDURE — 71046 X-RAY EXAM CHEST 2 VIEWS: CPT

## 2024-03-07 NOTE — PROGRESS NOTES
Assessment/Plan:    No problem-specific Assessment & Plan notes found for this encounter.       Diagnoses and all orders for this visit:    Right inguinal hernia  Comments:  Rt inguinal discomfort since x 1 month  Could not appreciate hernia on exam  We will obtain ultrasound  We will call patient with USG results  Conservative vs surgical approach based on imaging results    Orders:  -     Ambulatory referral to General Surgery  -     US groin/inguinal area; Future          PHQ-2/9 Depression Screening                Subjective:      Patient ID: Andrea Houston is a 68 y.o. male.    Patient is 69 yo M who presents to the office today with c/o right inguinal hernia. Patient states that he was diagnosed with the hernia in 1976 but was advised against the surgery. Patient states that he does martial arts and is quite active and fit and has never had issues all these years. He states that since last 1 month, he has been experiencing increasing discomfort in his right inguinal region. He states that it is aggravated on getting up from seated position. He states that he cares for his paraplegic wife and wonders if that has an impact on his increasing discomfort. He otherwise denies any bulge, nausea, vomiting, change in bowel/bladder habits.         The following portions of the patient's history were reviewed and updated as appropriate: allergies, current medications, past family history, past medical history, past social history, past surgical history, and problem list.    Review of Systems   Constitutional:  Negative for chills and fever.   HENT:  Negative for ear pain and sore throat.    Eyes:  Negative for pain and visual disturbance.   Respiratory:  Negative for cough and shortness of breath.    Cardiovascular:  Negative for chest pain and palpitations.   Gastrointestinal:  Negative for abdominal pain and vomiting.   Genitourinary:  Negative for dysuria and hematuria.        Right inguinal pain   Musculoskeletal:   "Negative for arthralgias and back pain.   Skin:  Negative for color change and rash.   Neurological:  Negative for seizures and syncope.   All other systems reviewed and are negative.        Objective:    /76 (BP Location: Left arm, Patient Position: Sitting, Cuff Size: Standard)   Pulse 74   Temp 98.5 °F (36.9 °C) (Temporal)   Ht 5' 7\" (1.702 m)   Wt 69.9 kg (154 lb)   SpO2 96%   BMI 24.12 kg/m²      Physical Exam  Vitals reviewed.   Constitutional:       General: He is not in acute distress.     Appearance: Normal appearance.   HENT:      Head: Normocephalic and atraumatic.   Eyes:      Conjunctiva/sclera: Conjunctivae normal.   Abdominal:      General: There is no distension.      Palpations: There is no mass.      Tenderness: There is no abdominal tenderness. There is no guarding.      Hernia: No hernia is present.   Skin:     General: Skin is warm and dry.   Neurological:      Mental Status: He is alert and oriented to person, place, and time.         "

## 2024-03-13 ENCOUNTER — HOSPITAL ENCOUNTER (OUTPATIENT)
Dept: ULTRASOUND IMAGING | Facility: HOSPITAL | Age: 69
Discharge: HOME | End: 2024-03-13
Attending: SURGERY
Payer: COMMERCIAL

## 2024-03-13 DIAGNOSIS — K40.90 RIGHT INGUINAL HERNIA: ICD-10-CM

## 2024-03-13 PROCEDURE — 76705 ECHO EXAM OF ABDOMEN: CPT

## 2024-03-25 ENCOUNTER — TELEPHONE (OUTPATIENT)
Dept: SURGERY | Facility: CLINIC | Age: 69
End: 2024-03-25

## 2024-03-25 NOTE — TELEPHONE ENCOUNTER
----- Message from Ge Borja DO sent at 3/25/2024 12:04 PM EDT -----  Hey guys I reached out to Andrea but got no answer.  I left a voicemail discussing his ultrasound which did not show a hernia.  I instructed him to call back if he wanted to discuss the results via phone or if he wanted to have an appointment to further discuss the imaging results.  I also said that if he would agree to it we could do a CT scan to further evaluate his groin pain and see if the CT scan shows a possible hernia.  So up to him but I did leave a message so wanted you guemile to be aware in case he does call the office.  So the options are he can either want to talk to me about this via phone next week when I get back, or see him in the office, or we can order a CT scan of the abdomen and pelvis with IV contrast if he wants to further evaluate this with a CT scan.  ----- Message -----  From: Olinda, Radiology Results In  Sent: 3/20/2024  12:51 PM EDT  To: Ge Borja DO

## 2024-03-27 ENCOUNTER — RA CDI HCC (OUTPATIENT)
Dept: OTHER | Facility: HOSPITAL | Age: 69
End: 2024-03-27

## 2024-04-03 ENCOUNTER — HOSPITAL ENCOUNTER (OUTPATIENT)
Dept: NON INVASIVE DIAGNOSTICS | Facility: HOSPITAL | Age: 69
Discharge: HOME | End: 2024-04-03
Attending: FAMILY MEDICINE
Payer: COMMERCIAL

## 2024-04-03 VITALS
HEART RATE: 81 BPM | DIASTOLIC BLOOD PRESSURE: 82 MMHG | SYSTOLIC BLOOD PRESSURE: 136 MMHG | BODY MASS INDEX: 24.17 KG/M2 | WEIGHT: 154 LBS | HEIGHT: 67 IN

## 2024-04-03 DIAGNOSIS — R01.1 HEART MURMUR: ICD-10-CM

## 2024-04-03 PROCEDURE — 93306 TTE W/DOPPLER COMPLETE: CPT

## 2024-04-03 PROCEDURE — 93306 TTE W/DOPPLER COMPLETE: CPT | Performed by: INTERNAL MEDICINE

## 2024-04-04 LAB
AORTIC ROOT: 3.3 CM
AORTIC VALVE MEAN VELOCITY: 15.1 M/S
APICAL FOUR CHAMBER EJECTION FRACTION: 61 %
ASCENDING AORTA: 3.6 CM
AV AREA BY CONTINUOUS VTI: 2.1 CM2
AV AREA PEAK VELOCITY: 1.9 CM2
AV LVOT MEAN GRADIENT: 3 MMHG
AV LVOT PEAK GRADIENT: 7 MMHG
AV MEAN GRADIENT: 11 MMHG
AV PEAK GRADIENT: 18 MMHG
AV VALVE AREA: 2.14 CM2
AV VELOCITY RATIO: 0.6
BSA FOR ECHO PROCEDURE: 1.81 M2
DOP CALC AO PEAK VEL: 2.14 M/S
DOP CALC AO VTI: 38.14 CM
DOP CALC LVOT AREA: 3.14 CM2
DOP CALC LVOT CARDIAC INDEX: 3.3 L/MIN/M2
DOP CALC LVOT CARDIAC OUTPUT: 5.98 L/MIN
DOP CALC LVOT DIAMETER: 2 CM
DOP CALC LVOT PEAK VEL VTI: 25.97 CM
DOP CALC LVOT PEAK VEL: 1.28 M/S
DOP CALC LVOT STROKE INDEX: 43.1 ML/M2
DOP CALC LVOT STROKE VOLUME: 78
E WAVE DECELERATION TIME: 196 MS
E/A RATIO: 0.96
FRACTIONAL SHORTENING: 32 (ref 28–44)
INTERVENTRICULAR SEPTUM IN DIASTOLE (PARASTERNAL SHORT AXIS VIEW): 1 CM
INTERVENTRICULAR SEPTUM: 1 CM (ref 0.6–1.1)
LAAS-AP2: 14.7 CM2
LAAS-AP4: 13 CM2
LEFT ATRIUM SIZE: 4.9 CM
LEFT ATRIUM VOLUME (MOD BIPLANE): 36 ML
LEFT ATRIUM VOLUME INDEX (MOD BIPLANE): 19.9 ML/M2
LEFT INTERNAL DIMENSION IN SYSTOLE: 3.2 CM (ref 2.1–4)
LEFT VENTRICULAR INTERNAL DIMENSION IN DIASTOLE: 4.7 CM (ref 3.5–6)
LEFT VENTRICULAR POSTERIOR WALL IN END DIASTOLE: 1 CM
LEFT VENTRICULAR STROKE VOLUME: 61 ML
LVSV (TEICH): 61 ML
MV E'TISSUE VEL-SEP: 7 CM/S
MV PEAK A VEL: 0.73 M/S
MV PEAK E VEL: 70 CM/S
MV STENOSIS PRESSURE HALF TIME: 57 MS
MV VALVE AREA P 1/2 METHOD: 3.9
RIGHT ATRIUM AREA SYSTOLE A4C: 16.6 CM2
RIGHT VENTRICLE ID DIMENSION: 3.5 CM
SINOTUBULAR JUNCTION: 3.3 CM
SL CV LEFT ATRIUM LENGTH A2C: 5 CM
SL CV LV EF: 60
SL CV PED ECHO LEFT VENTRICLE DIASTOLIC VOLUME (MOD BIPLANE) 2D: 101 ML
SL CV PED ECHO LEFT VENTRICLE SYSTOLIC VOLUME (MOD BIPLANE) 2D: 40 ML
SL CV SINUS OF VALSALVA 2D: 3.9 CM
STJ: 3.3 CM
TR MAX PG: 30 MMHG
TR PEAK VELOCITY: 2.8 M/S
TRICUSPID ANNULAR PLANE SYSTOLIC EXCURSION: 2.6 CM
TRICUSPID VALVE PEAK REGURGITATION VELOCITY: 2.76 M/S

## 2024-04-05 ENCOUNTER — RA CDI HCC (OUTPATIENT)
Dept: OTHER | Facility: HOSPITAL | Age: 69
End: 2024-04-05

## 2024-04-11 ENCOUNTER — OFFICE VISIT (OUTPATIENT)
Dept: FAMILY MEDICINE CLINIC | Facility: CLINIC | Age: 69
End: 2024-04-11
Payer: COMMERCIAL

## 2024-04-11 VITALS
DIASTOLIC BLOOD PRESSURE: 80 MMHG | SYSTOLIC BLOOD PRESSURE: 132 MMHG | HEART RATE: 68 BPM | HEIGHT: 67 IN | TEMPERATURE: 97.7 F | BODY MASS INDEX: 23.86 KG/M2 | WEIGHT: 152 LBS | OXYGEN SATURATION: 97 %

## 2024-04-11 DIAGNOSIS — K22.2 PEPTIC STRICTURE OF ESOPHAGUS: ICD-10-CM

## 2024-04-11 DIAGNOSIS — K21.00 GASTROESOPHAGEAL REFLUX DISEASE WITH ESOPHAGITIS WITHOUT HEMORRHAGE: ICD-10-CM

## 2024-04-11 PROCEDURE — 99214 OFFICE O/P EST MOD 30 MIN: CPT | Performed by: FAMILY MEDICINE

## 2024-04-11 PROCEDURE — G2211 COMPLEX E/M VISIT ADD ON: HCPCS | Performed by: FAMILY MEDICINE

## 2024-04-11 RX ORDER — OMEPRAZOLE 40 MG/1
40 CAPSULE, DELAYED RELEASE ORAL DAILY
Qty: 30 CAPSULE | Refills: 5 | Status: SHIPPED | OUTPATIENT
Start: 2024-04-11

## 2024-04-11 NOTE — PROGRESS NOTES
Assessment/Plan:    Problem List Items Addressed This Visit    None  Visit Diagnoses       Gastroesophageal reflux disease with esophagitis without hemorrhage        Peptic stricture of esophagus                 Diagnoses and all orders for this visit:    Gastroesophageal reflux disease with esophagitis without hemorrhage    Peptic stricture of esophagus        No problem-specific Assessment & Plan notes found for this encounter.        Subjective:      Patient ID: Andrea Houston is a 68 y.o. male.    Follow up on abdominal pain        The following portions of the patient's history were reviewed and updated as appropriate:   He has a past medical history of Collapse of right lung and GERD (gastroesophageal reflux disease).,  does not have any pertinent problems on file.,   has a past surgical history that includes Appendectomy; Gum surgery (04/01/2022); and Colonoscopy.,  family history includes Alcohol abuse in his father; Heart attack in his brother and father; Lymphoma in his mother and sister; Stroke in his brother.,   reports that he has never smoked. He has never used smokeless tobacco. He reports that he does not currently use alcohol. He reports that he does not use drugs.,  is allergic to chocolate - food allergy and ranitidine..  Current Outpatient Medications   Medication Sig Dispense Refill    Aspirin Buf,CaCarb-MgCarb-MgO, 81 MG TABS Take 1 tablet by mouth daily      B Complex Vitamins (B COMPLEX 50 PO) Take 1 capsule by mouth daily      cholecalciferol (VITAMIN D3) 1,000 units tablet Take 2,000 Units by mouth daily      Digestive Enzymes (PAPAYA AND ENZYMES PO) Take 1 tablet by mouth daily      Garlic 10 MG CAPS Take 1 capsule by mouth daily      ibuprofen (Advil) 200 mg tablet Take 400 mg by mouth every 6 (six) hours as needed for headaches      Multiple Vitamin (multivitamin) capsule Take by mouth      Omega 3-6-9 Fatty Acids (OMEGA 3-6-9 PO) Take 1 capsule by mouth 2 (two) times a day       "omeprazole (PriLOSEC) 40 MG capsule Take 1 capsule (40 mg total) by mouth daily 30 capsule 5    Suprep Bowel Prep Kit 17.5-3.13-1.6 GM/177ML SOLN Take 180 mL by mouth once for 1 dose 180 mL 0     No current facility-administered medications for this visit.       Review of Systems   Constitutional:  Negative for activity change, appetite change, diaphoresis, fatigue and fever.   HENT: Negative.  Negative for dental problem.    Eyes: Negative.    Respiratory:  Negative for apnea, cough, chest tightness, shortness of breath and wheezing.    Cardiovascular:  Negative for chest pain, palpitations and leg swelling.        Murmur patient has mild aortic stenosis with a gradient of about 18 mm he will be followed with another echocardiogram in about 18 months   Gastrointestinal:  Negative for abdominal distention, abdominal pain, anal bleeding, constipation, diarrhea, nausea and vomiting.   Endocrine: Negative for cold intolerance, heat intolerance, polydipsia, polyphagia and polyuria.   Genitourinary:  Negative for difficulty urinating, dysuria, flank pain, hematuria and urgency.   Musculoskeletal:  Negative for arthralgias, back pain, gait problem, joint swelling and myalgias.   Skin:  Negative for color change, rash and wound.   Allergic/Immunologic: Negative for environmental allergies, food allergies and immunocompromised state.   Neurological:  Negative for dizziness, seizures, syncope, speech difficulty, numbness and headaches.   Hematological:  Negative for adenopathy. Does not bruise/bleed easily.   Psychiatric/Behavioral:  Negative for agitation, behavioral problems, hallucinations, sleep disturbance and suicidal ideas.          Objective:  Vitals:    04/11/24 1438   BP: 132/80   BP Location: Left arm   Patient Position: Sitting   Cuff Size: Standard   Pulse: 68   Temp: 97.7 °F (36.5 °C)   TempSrc: Temporal   SpO2: 97%   Weight: 68.9 kg (152 lb)   Height: 5' 7\" (1.702 m)     Body mass index is 23.81 kg/m².     " Physical Exam  Constitutional:       General: He is not in acute distress.     Appearance: He is well-developed. He is not diaphoretic.   HENT:      Head: Normocephalic.      Right Ear: External ear normal.      Left Ear: External ear normal.      Nose: Nose normal.   Eyes:      General: No scleral icterus.        Right eye: No discharge.         Left eye: No discharge.      Conjunctiva/sclera: Conjunctivae normal.      Pupils: Pupils are equal, round, and reactive to light.   Neck:      Thyroid: No thyromegaly.      Trachea: No tracheal deviation.   Cardiovascular:      Rate and Rhythm: Normal rate and regular rhythm.      Heart sounds: Normal heart sounds. No murmur heard.     No friction rub. No gallop.   Pulmonary:      Effort: Pulmonary effort is normal. No respiratory distress.      Breath sounds: Normal breath sounds. No wheezing.   Abdominal:      General: Bowel sounds are normal.      Palpations: Abdomen is soft. There is no mass.      Tenderness: There is no abdominal tenderness. There is no guarding.   Musculoskeletal:         General: No deformity.      Cervical back: Normal range of motion.   Lymphadenopathy:      Cervical: No cervical adenopathy.   Skin:     General: Skin is warm and dry.      Findings: No erythema or rash.   Neurological:      Mental Status: He is alert and oriented to person, place, and time.      Cranial Nerves: No cranial nerve deficit.   Psychiatric:         Thought Content: Thought content normal.

## 2024-04-19 DIAGNOSIS — B35.1 ONYCHOMYCOSIS: Primary | ICD-10-CM

## 2024-04-19 RX ORDER — EFINACONAZOLE 100 MG/ML
8 SOLUTION TOPICAL DAILY
Qty: 8 ML | Refills: 3 | Status: SHIPPED | OUTPATIENT
Start: 2024-04-19

## 2024-04-22 ENCOUNTER — TELEPHONE (OUTPATIENT)
Dept: FAMILY MEDICINE CLINIC | Facility: CLINIC | Age: 69
End: 2024-04-22

## 2024-04-22 DIAGNOSIS — K04.7 DENTAL ABSCESS: Primary | ICD-10-CM

## 2024-04-22 RX ORDER — AMOXICILLIN AND CLAVULANATE POTASSIUM 875; 125 MG/1; MG/1
1 TABLET, FILM COATED ORAL EVERY 12 HOURS SCHEDULED
Qty: 14 TABLET | Refills: 0 | Status: SHIPPED | OUTPATIENT
Start: 2024-04-22 | End: 2024-04-29

## 2024-04-22 NOTE — TELEPHONE ENCOUNTER
Patient states he woke up in the middle of night, his jaw is swollen, he has an abscess. Does not currently have a dentist as he passed away. Asking for something to be sent in. Please advise.

## 2024-04-23 ENCOUNTER — OFFICE VISIT (OUTPATIENT)
Dept: URGENT CARE | Facility: MEDICAL CENTER | Age: 69
End: 2024-04-23
Payer: COMMERCIAL

## 2024-04-23 VITALS — OXYGEN SATURATION: 98 % | RESPIRATION RATE: 18 BRPM | TEMPERATURE: 98.4 F | HEART RATE: 88 BPM

## 2024-04-23 DIAGNOSIS — K04.7 DENTAL ABSCESS: Primary | ICD-10-CM

## 2024-04-23 PROCEDURE — S9088 SERVICES PROVIDED IN URGENT: HCPCS | Performed by: PHYSICIAN ASSISTANT

## 2024-04-23 PROCEDURE — 99212 OFFICE O/P EST SF 10 MIN: CPT | Performed by: PHYSICIAN ASSISTANT

## 2024-04-23 NOTE — PROGRESS NOTES
[facility]        NAME: Andrea Houston is a 68 y.o. male  : 1955    MRN: [id number]  DATE: 2024  TIME: 2:35 PM    Assessment and Plan   Dental abscess [K04.7]  1. Dental abscess              Patient Instructions     Take antibiotic as directed which was sent in by Dr Anaya  Follow up with dentist as scheduled   Follow up with PCP in 3-5 days.  Proceed to  ER if symptoms worsen.    If tests have been performed at [facility], our office will contact you with results if changes need to be made to the care plan discussed with you at the visit.  You can review your full results on [facility].    Chief Complaint     Chief Complaint   Patient presents with    Dental Pain     Left lower area swelling , pain         History of Present Illness       Dental Pain   This is a new problem. Episode onset: 2 days ago. The problem occurs constantly. The problem has been gradually improving. The pain is moderate. Associated symptoms include facial pain. Pertinent negatives include no fever. He has tried NSAIDs for the symptoms. The treatment provided mild relief.   Andrea reports that he broke a tooth about 1 month ago and has been applying Ambisol.  2 days ago he noted onset of pain and swelling in the left lower jaw.  He called his dentist and has an appointment in 2 months.  He called Dr Anaya who sent in Augmentin but he did not realize antibiotic was sent in therefore presents to [facility].  He reports all but 3 teeth were removed a few years ago after all of his fillings fell out after chronic opioid use.   PMH: RASHMI, GERD   Review of Systems   Review of Systems   Constitutional:  Negative for chills and fever.   HENT:  Positive for dental problem. Negative for ear pain and sore throat.    Eyes:  Negative for pain and visual disturbance.   Respiratory:  Negative for cough and shortness of breath.    Cardiovascular:  Negative for chest pain and palpitations.   Gastrointestinal:  Negative for  abdominal pain and vomiting.   Genitourinary:  Negative for dysuria and hematuria.   Musculoskeletal:  Negative for arthralgias and back pain.   Skin:  Negative for color change and rash.   Neurological:  Negative for seizures and syncope.   All other systems reviewed and are negative.        Current Medications       Current Outpatient Medications:     Aspirin Buf,CaCarb-MgCarb-MgO, 81 MG TABS, Take 1 tablet by mouth daily, Disp: , Rfl:     B Complex Vitamins (B COMPLEX 50 PO), Take 1 capsule by mouth daily, Disp: , Rfl:     cholecalciferol (VITAMIN D3) 1,000 units tablet, Take 2,000 Units by mouth daily, Disp: , Rfl:     Digestive Enzymes (PAPAYA AND ENZYMES PO), Take 1 tablet by mouth daily, Disp: , Rfl:     Efinaconazole (Jublia) 10 % SOLN, Apply 8 mL topically daily, Disp: 8 mL, Rfl: 3    Garlic 10 MG CAPS, Take 1 capsule by mouth daily, Disp: , Rfl:     ibuprofen (Advil) 200 mg tablet, Take 400 mg by mouth every 6 (six) hours as needed for headaches, Disp: , Rfl:     Multiple Vitamin (multivitamin) capsule, Take by mouth, Disp: , Rfl:     Omega 3-6-9 Fatty Acids (OMEGA 3-6-9 PO), Take 1 capsule by mouth 2 (two) times a day, Disp: , Rfl:     omeprazole (PriLOSEC) 40 MG capsule, Take 1 capsule (40 mg total) by mouth daily, Disp: 30 capsule, Rfl: 5    amoxicillin-clavulanate (AUGMENTIN) 875-125 mg per tablet, Take 1 tablet by mouth every 12 (twelve) hours for 7 days (Patient not taking: Reported on 4/23/2024), Disp: 14 tablet, Rfl: 0    Suprep Bowel Prep Kit 17.5-3.13-1.6 GM/177ML SOLN, Take 180 mL by mouth once for 1 dose, Disp: 180 mL, Rfl: 0    Current Allergies     Allergies as of 04/23/2024 - Reviewed 04/11/2024   Allergen Reaction Noted    Chocolate - food allergy Other (See Comments) 01/08/2011    Ranitidine Edema 03/09/2016            The following portions of the patient's history were reviewed and updated as appropriate: allergies, current medications, past family history, past medical history, past  social history, past surgical history and problem list.     Past Medical History:   Diagnosis Date    Collapse of right lung     GERD (gastroesophageal reflux disease)        Past Surgical History:   Procedure Laterality Date    APPENDECTOMY      COLONOSCOPY      GUM SURGERY  04/01/2022    Oral extraction - 7 teeth       Family History   Problem Relation Age of Onset    Lymphoma Mother     Alcohol abuse Father     Heart attack Father     Lymphoma Sister     Heart attack Brother     Stroke Brother          Medications have been verified.        Objective   Pulse 88   Temp 98.4 °F (36.9 °C)   Resp 18   SpO2 98%   No LMP for male patient.       Physical Exam     Physical Exam  Vitals and nursing note reviewed.   Constitutional:       Appearance: Normal appearance. He is not ill-appearing.   HENT:      Head: Normocephalic and atraumatic.      Nose: Nose normal.      Mouth/Throat:      Comments: Edema of the left lower jaw    3 teeth are present in the left lower jaw, the 1st molar is broken and necrotic.  Gingival area is tender and swollen below this tooth.  No palpable fluctuance.    Neck:      Comments: No palpable masses or fluctuance in the neck.   Cardiovascular:      Rate and Rhythm: Normal rate and regular rhythm.      Pulses: Normal pulses.      Heart sounds: Normal heart sounds.   Pulmonary:      Effort: Pulmonary effort is normal.      Breath sounds: Normal breath sounds.   Lymphadenopathy:      Cervical: Cervical adenopathy present.   Skin:     General: Skin is warm and dry.   Neurological:      Mental Status: He is alert and oriented to person, place, and time.   Psychiatric:         Mood and Affect: Mood normal.         Behavior: Behavior normal.

## 2024-04-23 NOTE — PATIENT INSTRUCTIONS
Take antibiotic as directed which was sent in by Dr Anaya  Follow up with dentist as scheduled   Follow up with PCP in 3-5 days.  Proceed to  ER if symptoms worsen.    If tests have been performed at [facility], our office will contact you with results if changes need to be made to the care plan discussed with you at the visit.  You can review your full results on [facility].

## 2024-10-12 ENCOUNTER — TELEPHONE (OUTPATIENT)
Dept: OTHER | Facility: OTHER | Age: 69
End: 2024-10-12

## 2024-10-15 ENCOUNTER — OFFICE VISIT (OUTPATIENT)
Dept: FAMILY MEDICINE CLINIC | Facility: CLINIC | Age: 69
End: 2024-10-15
Payer: COMMERCIAL

## 2024-10-15 VITALS
BODY MASS INDEX: 24.96 KG/M2 | DIASTOLIC BLOOD PRESSURE: 76 MMHG | SYSTOLIC BLOOD PRESSURE: 122 MMHG | HEART RATE: 73 BPM | TEMPERATURE: 97.5 F | OXYGEN SATURATION: 98 % | WEIGHT: 159 LBS | HEIGHT: 67 IN

## 2024-10-15 DIAGNOSIS — Z00.00 MEDICARE ANNUAL WELLNESS VISIT, SUBSEQUENT: Primary | ICD-10-CM

## 2024-10-15 DIAGNOSIS — K21.9 GASTROESOPHAGEAL REFLUX DISEASE WITHOUT ESOPHAGITIS: ICD-10-CM

## 2024-10-15 DIAGNOSIS — N40.0 PROSTATE HYPERTROPHY: ICD-10-CM

## 2024-10-15 DIAGNOSIS — K22.2 PEPTIC STRICTURE OF ESOPHAGUS: ICD-10-CM

## 2024-10-15 DIAGNOSIS — K21.00 GASTROESOPHAGEAL REFLUX DISEASE WITH ESOPHAGITIS WITHOUT HEMORRHAGE: ICD-10-CM

## 2024-10-15 DIAGNOSIS — Z23 NEED FOR IMMUNIZATION AGAINST INFLUENZA: ICD-10-CM

## 2024-10-15 PROCEDURE — G0008 ADMIN INFLUENZA VIRUS VAC: HCPCS | Performed by: FAMILY MEDICINE

## 2024-10-15 PROCEDURE — G0439 PPPS, SUBSEQ VISIT: HCPCS | Performed by: FAMILY MEDICINE

## 2024-10-15 PROCEDURE — 90662 IIV NO PRSV INCREASED AG IM: CPT | Performed by: FAMILY MEDICINE

## 2024-10-15 RX ORDER — OMEPRAZOLE 40 MG/1
40 CAPSULE, DELAYED RELEASE ORAL DAILY
Qty: 30 CAPSULE | Refills: 5 | Status: SHIPPED | OUTPATIENT
Start: 2024-10-15

## 2024-10-15 NOTE — PROGRESS NOTES
Ambulatory Visit  Name: Andrea Houston      : 1955      MRN: [id number]  Encounter Provider: Nolberto Anaya DO  Encounter Date: 10/15/2024   Encounter department: [facility]    Assessment & Plan      Depression Screening and Follow-up Plan: Patient was screened for depression during today's encounter. They screened negative with a PHQ-2 score of 1.      Preventive health issues were discussed with patient, and age appropriate screening tests were ordered as noted in patient's After Visit Summary. Personalized health advice and appropriate referrals for health education or preventive services given if needed, as noted in patient's After Visit Summary.    History of Present Illness     Patient here for Medicare subsequent well visit       Patient Care Team:  Nolberto Anaya DO as PCP - General (Family Medicine)    Review of Systems   Constitutional:  Positive for activity change and fatigue. Negative for appetite change, diaphoresis and fever.   HENT: Negative.  Negative for dental problem, hearing loss and postnasal drip.    Eyes:  Positive for visual disturbance.        Patient wears corrective lenses   Respiratory:  Negative for apnea, chest tightness, shortness of breath and wheezing.    Cardiovascular:  Negative for chest pain, palpitations and leg swelling.   Gastrointestinal:  Negative for abdominal distention, abdominal pain, anal bleeding, constipation, diarrhea, nausea and vomiting.        Up to date on colonoscopies   Endocrine: Negative for cold intolerance, heat intolerance, polydipsia, polyphagia and polyuria.   Genitourinary:  Negative for difficulty urinating, dysuria, flank pain, hematuria and urgency.   Musculoskeletal:  Negative for arthralgias, back pain, gait problem, joint swelling and myalgias.   Skin:  Negative for color change, rash and wound.   Allergic/Immunologic: Negative for environmental allergies, food allergies and immunocompromised state.   Neurological:  Positive  for headaches. Negative for dizziness, seizures, syncope, speech difficulty and numbness.   Hematological:  Negative for adenopathy. Does not bruise/bleed easily.   Psychiatric/Behavioral:  Negative for agitation, behavioral problems, hallucinations, sleep disturbance and suicidal ideas.      Medical History Reviewed by provider this encounter:       Annual Wellness Visit Questionnaire   Andrea is here for his Subsequent Wellness visit.     Health Risk Assessment:   Patient rates overall health as good. Patient feels that their physical health rating is slightly better. Patient is satisfied with their life. Eyesight was rated as slightly worse. Hearing was rated as same. Patient feels that their emotional and mental health rating is slightly better. Patients states they are sometimes angry. Patient states they are often unusually tired/fatigued. Pain experienced in the last 7 days has been some. Patient's pain rating has been 3/10. Patient states that he has experienced no weight loss or gain in last 6 months.     Depression Screening:   PHQ-2 Score: 1      Fall Risk Screening:   In the past year, patient has experienced: no history of falling in past year      Home Safety:  Patient does not have trouble with stairs inside or outside of their home. Patient has working smoke alarms and has working carbon monoxide detector. Home safety hazards include: none.     Nutrition:   Current diet is Regular.     Medications:   Patient is currently taking over-the-counter supplements. OTC medications include: see medication list. Patient is able to manage medications.     Activities of Daily Living (ADLs)/Instrumental Activities of Daily Living (IADLs):   Walk and transfer into and out of bed and chair?: Yes  Dress and groom yourself?: Yes    Bathe or shower yourself?: Yes    Feed yourself? Yes  Do your laundry/housekeeping?: Yes  Manage your money, pay your bills and track your expenses?: Yes  Make your own meals?: Yes    Do  your own shopping?: Yes    Durable Medical Equipment Suppliers  CP[employer] - Company unknown (Using [employer])    Previous Hospitalizations:   Any hospitalizations or ED visits within the last 12 months?: No      Advance Care Planning:   Living will: Yes    Durable POA for healthcare: Yes    Advanced directive: Yes    Advanced directive counseling given: Yes    ACP document given: Yes    Patient declined ACP directive: No    End of Life Decisions reviewed with patient: Yes    Provider agrees with end of life decisions: Yes      Cognitive Screening:   Provider or family/friend/caregiver concerned regarding cognition?: No    PREVENTIVE SCREENINGS      Cardiovascular Screening:    General: Screening Not Indicated and History Lipid Disorder      Diabetes Screening:     General: Screening Current      Colorectal Cancer Screening:     General: Screening Current      Osteoporosis Screening:    General: Screening Current    Due for: DXA Appendicular      Abdominal Aortic Aneurysm (AAA) Screening:    Risk factors include: age between 65-74 yo        General: Risks and Benefits Discussed      Lung Cancer Screening:     General: Screening Not Indicated      Hepatitis C Screening:    General: Patient Declines    Screening, Brief Intervention, and Referral to Treatment (SBIRT)    Screening  Typical number of drinks in a day: 0  Typical number of drinks in a week: 0  Interpretation: Low risk drinking behavior.    Single Item Drug Screening:  How often have you used an illegal drug (including marijuana) or a prescription medication for non-medical reasons in the past year? never    Single Item Drug Screen Score: 0  Interpretation: Negative screen for possible drug use disorder    Social Determinants of Health     Financial Resource Strain: Low Risk  (10/10/2023)    Overall Financial Resource Strain (CARDIA)     Difficulty of Paying Living Expenses: Not hard at all   Transportation Needs: No Transportation Needs (10/10/2023)    PRAPARE -  "Transportation     Lack of Transportation (Medical): No     Lack of Transportation (Non-Medical): No    Received from [employer]     No results found.    Objective     Pulse 73   Temp 97.5 °F (36.4 °C) (Temporal)   Ht 5' 7\" (1.702 m)   Wt 72.1 kg (159 lb)   SpO2 98%   BMI 24.90 kg/m²     Physical Exam  Constitutional:       Appearance: Normal appearance.   HENT:      Head: Normocephalic and atraumatic.      Left Ear: Tympanic membrane normal.      Nose: Nose normal. No congestion or rhinorrhea.   Eyes:      Extraocular Movements: Extraocular movements intact.      Conjunctiva/sclera: Conjunctivae normal.      Pupils: Pupils are equal, round, and reactive to light.   Cardiovascular:      Rate and Rhythm: Normal rate and regular rhythm.   Pulmonary:      Effort: Pulmonary effort is normal.      Breath sounds: Normal breath sounds.   Abdominal:      General: Abdomen is flat.      Palpations: Abdomen is soft.   Genitourinary:     Prostate: Normal.      Rectum: Normal.   Musculoskeletal:         General: Normal range of motion.   Skin:     General: Skin is warm and dry.   Neurological:      General: No focal deficit present.      Mental Status: He is alert and oriented to person, place, and time.         "

## 2024-10-15 NOTE — PATIENT INSTRUCTIONS
Medicare Preventive Visit Patient Instructions  Thank you for completing your Welcome to Medicare Visit or Medicare Annual Wellness Visit today. Your next wellness visit will be due in one year (10/16/2025).  The screening/preventive services that you may require over the next 5-10 years are detailed below. Some tests may not apply to you based off risk factors and/or age. Screening tests ordered at today's visit but not completed yet may show as past due. Also, please note that scanned in results may not display below.  Preventive Screenings:  Service Recommendations Previous Testing/Comments   Colorectal Cancer Screening  Colonoscopy    Fecal Occult Blood Test (FOBT)/Fecal Immunochemical Test (FIT)  Fecal DNA/Cologuard Test  Flexible Sigmoidoscopy Age: 45-75 years old   Colonoscopy: every 10 years (May be performed more frequently if at higher risk)  OR  FOBT/FIT: every 1 year  OR  Cologuard: every 3 years  OR  Sigmoidoscopy: every 5 years  Screening may be recommended earlier than age 45 if at higher risk for colorectal cancer. Also, an individualized decision between you and your healthcare provider will decide whether screening between the ages of 76-85 would be appropriate. Colonoscopy: 07/16/2021  FOBT/FIT: Not on file  Cologuard: Not on file  Sigmoidoscopy: Not on file    Screening Current     Prostate Cancer Screening Individualized decision between patient and health care provider in men between ages of 55-69   Medicare will cover every 12 months beginning on the day after your 50th birthday PSA: No results in last 5 years           Hepatitis C Screening Once for adults born between 1945 and 1965  More frequently in patients at high risk for Hepatitis C Hep C Antibody: Not on file        Diabetes Screening 1-2 times per year if you're at risk for diabetes or have pre-diabetes Fasting glucose: 106 mg/dL (2/27/2024)  A1C: No results in last 5 years (No results in last 5 years)  Screening Current    Cholesterol Screening Once every 5 years if you don't have a lipid disorder. May order more often based on risk factors. Lipid panel: 02/27/2024  Screening Not Indicated  History Lipid Disorder      Other Preventive Screenings Covered by Medicare:  Abdominal Aortic Aneurysm (AAA) Screening: covered once if your at risk. You're considered to be at risk if you have a family history of AAA or a male between the age of 65-75 who smoking at least 100 cigarettes in your lifetime.  Lung Cancer Screening: covers low dose CT scan once per year if you meet all of the following conditions: (1) Age 55-77; (2) No signs or symptoms of lung cancer; (3) Current smoker or have quit smoking within the last 15 years; (4) You have a tobacco smoking history of at least 20 pack years (packs per day x number of years you smoked); (5) You get a written order from a healthcare provider.  Glaucoma Screening: covered annually if you're considered high risk: (1) You have diabetes OR (2) Family history of glaucoma OR (3)  aged 50 and older OR (4)  American aged 65 and older  Osteoporosis Screening: covered every 2 years if you meet one of the following conditions: (1) Have a vertebral abnormality; (2) On glucocorticoid therapy for more than 3 months; (3) Have primary hyperparathyroidism; (4) On osteoporosis medications and need to assess response to drug therapy.  HIV Screening: covered annually if you're between the age of 15-65. Also covered annually if you are younger than 15 and older than 65 with risk factors for HIV infection. For pregnant patients, it is covered up to 3 times per pregnancy.    Immunizations:  Immunization Recommendations   Influenza Vaccine Annual influenza vaccination during flu season is recommended for all persons aged >= 6 months who do not have contraindications   Pneumococcal Vaccine   * Pneumococcal conjugate vaccine = PCV13 (Prevnar 13), PCV15 (Vaxneuvance), PCV20 (Prevnar 20)  *  Pneumococcal polysaccharide vaccine = PPSV23 (Pneumovax) Adults 19-65 yo with certain risk factors or if 65+ yo  If never received any pneumonia vaccine: recommend Prevnar 20 (PCV20)  Give PCV20 if previously received 1 dose of PCV13 or PPSV23   Hepatitis B Vaccine 3 dose series if at intermediate or high risk (ex: diabetes, end stage renal disease, liver disease)   Respiratory syncytial virus (RSV) Vaccine - COVERED BY MEDICARE PART D  * RSVPreF3 (Arexvy) CDC recommends that adults 60 years of age and older may receive a single dose of RSV vaccine using shared clinical decision-making (SCDM)   Tetanus (Td) Vaccine - COST NOT COVERED BY MEDICARE PART B Following completion of primary series, a booster dose should be given every 10 years to maintain immunity against tetanus. Td may also be given as tetanus wound prophylaxis.   Tdap Vaccine - COST NOT COVERED BY MEDICARE PART B Recommended at least once for all adults. For pregnant patients, recommended with each pregnancy.   Shingles Vaccine (Shingrix) - COST NOT COVERED BY MEDICARE PART B  2 shot series recommended in those 19 years and older who have or will have weakened immune systems or those 50 years and older     Health Maintenance Due:      Topic Date Due   • Hepatitis C Screening  Never done   • Colorectal Cancer Screening  07/14/2031     Immunizations Due:      Topic Date Due   • Influenza Vaccine (1) 09/01/2024     Advance Directives   What are advance directives?  Advance directives are legal documents that state your wishes and plans for medical care. These plans are made ahead of time in case you lose your ability to make decisions for yourself. Advance directives can apply to any medical decision, such as the treatments you want, and if you want to donate organs.   What are the types of advance directives?  There are many types of advance directives, and each state has rules about how to use them. You may choose a combination of any of the  following:  Living will:  This is a written record of the treatment you want. You can also choose which treatments you do not want, which to limit, and which to stop at a certain time. This includes surgery, medicine, IV fluid, and tube feedings.   Durable power of  for healthcare (DPAHC):  This is a written record that states who you want to make healthcare choices for you when you are unable to make them for yourself. This person, called a proxy, is usually a family member or a friend. You may choose more than 1 proxy.  Do not resuscitate (DNR) order:  A DNR order is used in case your heart stops beating or you stop breathing. It is a request not to have certain forms of treatment, such as CPR. A DNR order may be included in other types of advance directives.  Medical directive:  This covers the care that you want if you are in a coma, near death, or unable to make decisions for yourself. You can list the treatments you want for each condition. Treatment may include pain medicine, surgery, blood transfusions, dialysis, IV or tube feedings, and a ventilator (breathing machine).  Values history:  This document has questions about your views, beliefs, and how you feel and think about life. This information can help others choose the care that you would choose.  Why are advance directives important?  An advance directive helps you control your care. Although spoken wishes may be used, it is better to have your wishes written down. Spoken wishes can be misunderstood, or not followed. Treatments may be given even if you do not want them. An advance directive may make it easier for your family to make difficult choices about your care.       © Copyright [employer] 2018 Information is for End User's use only and may not be sold, redistributed or otherwise used for commercial purposes. All illustrations and images included in CareNotes® are the copyrighted property of [employer] Inc[employer] or [employer]

## 2024-11-05 ENCOUNTER — LAB (OUTPATIENT)
Dept: LAB | Facility: MEDICAL CENTER | Age: 69
End: 2024-11-05
Payer: COMMERCIAL

## 2024-11-05 DIAGNOSIS — N40.0 PROSTATE HYPERTROPHY: Primary | ICD-10-CM

## 2024-11-05 LAB
ANION GAP SERPL CALCULATED.3IONS-SCNC: 6 MMOL/L (ref 4–13)
BUN SERPL-MCNC: 26 MG/DL (ref 5–25)
CALCIUM SERPL-MCNC: 8.5 MG/DL (ref 8.4–10.2)
CHLORIDE SERPL-SCNC: 104 MMOL/L (ref 96–108)
CO2 SERPL-SCNC: 28 MMOL/L (ref 21–32)
CREAT SERPL-MCNC: 0.73 MG/DL (ref 0.6–1.3)
GFR SERPL CREATININE-BSD FRML MDRD: 94 ML/MIN/1.73SQ M
GLUCOSE SERPL-MCNC: 125 MG/DL (ref 65–140)
POTASSIUM SERPL-SCNC: 4.1 MMOL/L (ref 3.5–5.3)
PSA FREE MFR SERPL: 31.36 %
PSA FREE SERPL-MCNC: 0.11 NG/ML
PSA SERPL-MCNC: 0.35 NG/ML (ref 0–4)
SODIUM SERPL-SCNC: 138 MMOL/L (ref 135–147)

## 2024-11-05 PROCEDURE — 36415 COLL VENOUS BLD VENIPUNCTURE: CPT

## 2024-11-05 PROCEDURE — 84153 ASSAY OF PSA TOTAL: CPT

## 2024-11-05 PROCEDURE — 84154 ASSAY OF PSA FREE: CPT

## 2025-04-15 ENCOUNTER — RA CDI HCC (OUTPATIENT)
Dept: OTHER | Facility: HOSPITAL | Age: 70
End: 2025-04-15

## 2025-04-16 ENCOUNTER — OFFICE VISIT (OUTPATIENT)
Dept: FAMILY MEDICINE CLINIC | Facility: CLINIC | Age: 70
End: 2025-04-16
Payer: COMMERCIAL

## 2025-04-16 VITALS
HEART RATE: 64 BPM | OXYGEN SATURATION: 91 % | WEIGHT: 156 LBS | BODY MASS INDEX: 24.48 KG/M2 | SYSTOLIC BLOOD PRESSURE: 118 MMHG | TEMPERATURE: 97.4 F | HEIGHT: 67 IN | DIASTOLIC BLOOD PRESSURE: 82 MMHG

## 2025-04-16 DIAGNOSIS — K21.00 GASTROESOPHAGEAL REFLUX DISEASE WITH ESOPHAGITIS WITHOUT HEMORRHAGE: ICD-10-CM

## 2025-04-16 DIAGNOSIS — K22.2 PEPTIC STRICTURE OF ESOPHAGUS: ICD-10-CM

## 2025-04-16 PROCEDURE — G2211 COMPLEX E/M VISIT ADD ON: HCPCS | Performed by: FAMILY MEDICINE

## 2025-04-16 PROCEDURE — 99214 OFFICE O/P EST MOD 30 MIN: CPT | Performed by: FAMILY MEDICINE

## 2025-04-16 RX ORDER — OMEPRAZOLE 40 MG/1
40 CAPSULE, DELAYED RELEASE ORAL DAILY
Qty: 30 CAPSULE | Refills: 5 | Status: SHIPPED | OUTPATIENT
Start: 2025-04-16

## 2025-04-16 NOTE — PROGRESS NOTES
Name: Andrea Houston      : 1955      MRN: [id number]  Encounter Provider: Nolberto Anaya DO  Encounter Date: 2025   Encounter department: [facility]  :  Assessment & Plan  Gastroesophageal reflux disease with esophagitis without hemorrhage         Peptic stricture of esophagus               Depression Screening and Follow-up Plan: Patient was screened for depression during today's encounter. They screened negative with a PHQ-2 score of 0.        History of Present Illness   Mr. Rodney is here for a follow-up visit he seems to be doing okay from the standpoint of his gastroesophageal reflux and also his inguinal area is is doing well despite the fact that he is lifting more than his restriction at work vital signs are stable blood pressure is good eating well moving his bowels well no issues right now      Review of Systems   Constitutional:  Negative for activity change, appetite change, diaphoresis, fatigue and fever.   HENT:  Positive for hearing loss. Negative for dental problem.    Eyes:  Positive for visual disturbance.   Respiratory:  Negative for apnea, cough, chest tightness, shortness of breath and wheezing.    Cardiovascular:  Negative for chest pain, palpitations and leg swelling.   Gastrointestinal:  Negative for abdominal distention, abdominal pain, anal bleeding, constipation, diarrhea, nausea and vomiting.   Endocrine: Negative for cold intolerance, heat intolerance, polydipsia, polyphagia and polyuria.   Genitourinary:  Negative for difficulty urinating, dysuria, flank pain, hematuria and urgency.   Musculoskeletal:  Negative for arthralgias, back pain, gait problem, joint swelling and myalgias.   Skin:  Negative for color change, rash and wound.   Allergic/Immunologic: Negative for environmental allergies, food allergies and immunocompromised state.   Neurological:  Negative for dizziness, seizures, syncope, speech difficulty, numbness and headaches.   Hematological:   "Negative for adenopathy. Does not bruise/bleed easily.   Psychiatric/Behavioral:  Negative for agitation, behavioral problems, hallucinations, sleep disturbance and suicidal ideas.        Objective   /82 (BP Location: Left arm, Patient Position: Sitting, Cuff Size: Standard)   Pulse 64   Temp (!) 97.4 °F (36.3 °C) (Temporal)   Ht 5' 7\" (1.702 m)   Wt 70.8 kg (156 lb)   SpO2 91%   BMI 24.43 kg/m²      Physical Exam  Constitutional:       Appearance: He is well-developed.   HENT:      Head: Normocephalic and atraumatic.      Right Ear: External ear normal.      Left Ear: External ear normal.      Nose: Nose normal.   Eyes:      Conjunctiva/sclera: Conjunctivae normal.      Pupils: Pupils are equal, round, and reactive to light.   Cardiovascular:      Rate and Rhythm: Normal rate and regular rhythm.      Heart sounds: Normal heart sounds. No murmur heard.     No friction rub.   Pulmonary:      Effort: Pulmonary effort is normal. No respiratory distress.      Breath sounds: Normal breath sounds. No wheezing or rales.   Chest:      Chest wall: No tenderness.   Abdominal:      General: Bowel sounds are normal.      Palpations: Abdomen is soft.   Musculoskeletal:         General: Normal range of motion.      Cervical back: Normal range of motion and neck supple.   Skin:     General: Skin is warm and dry.      Capillary Refill: Capillary refill takes 2 to 3 seconds.   Neurological:      Mental Status: He is alert and oriented to person, place, and time.   Psychiatric:         Behavior: Behavior normal.         Thought Content: Thought content normal.         Judgment: Judgment normal.         "